# Patient Record
Sex: FEMALE | Race: WHITE | NOT HISPANIC OR LATINO | Employment: FULL TIME | URBAN - METROPOLITAN AREA
[De-identification: names, ages, dates, MRNs, and addresses within clinical notes are randomized per-mention and may not be internally consistent; named-entity substitution may affect disease eponyms.]

---

## 2019-11-05 ENCOUNTER — OFFICE VISIT (OUTPATIENT)
Dept: URGENT CARE | Facility: CLINIC | Age: 34
End: 2019-11-05

## 2019-11-05 VITALS
HEIGHT: 66 IN | WEIGHT: 180 LBS | HEART RATE: 70 BPM | BODY MASS INDEX: 28.93 KG/M2 | SYSTOLIC BLOOD PRESSURE: 114 MMHG | RESPIRATION RATE: 18 BRPM | OXYGEN SATURATION: 98 % | TEMPERATURE: 99 F | DIASTOLIC BLOOD PRESSURE: 72 MMHG

## 2019-11-05 DIAGNOSIS — J06.9 VIRAL URI: Primary | ICD-10-CM

## 2019-11-05 DIAGNOSIS — J02.9 SORE THROAT: ICD-10-CM

## 2019-11-05 LAB
CTP QC/QA: YES
S PYO RRNA THROAT QL PROBE: NEGATIVE

## 2019-11-05 PROCEDURE — 99203 OFFICE O/P NEW LOW 30 MIN: CPT | Mod: S$GLB,,, | Performed by: FAMILY MEDICINE

## 2019-11-05 PROCEDURE — 99203 PR OFFICE/OUTPT VISIT, NEW, LEVL III, 30-44 MIN: ICD-10-PCS | Mod: S$GLB,,, | Performed by: FAMILY MEDICINE

## 2019-11-05 PROCEDURE — 87880 STREP A ASSAY W/OPTIC: CPT | Mod: QW,S$GLB,, | Performed by: FAMILY MEDICINE

## 2019-11-05 PROCEDURE — 87880 POCT RAPID STREP A: ICD-10-PCS | Mod: QW,S$GLB,, | Performed by: FAMILY MEDICINE

## 2019-11-05 NOTE — PROGRESS NOTES
"Subjective:       Patient ID: Terri Newell is a 34 y.o. female.    Vitals:  height is 5' 6" (1.676 m) and weight is 81.6 kg (180 lb). Her tympanic temperature is 99 °F (37.2 °C). Her blood pressure is 114/72 and her pulse is 70. Her respiration is 18 and oxygen saturation is 98%.     Chief Complaint: Oral Pain (thrush)    34-year-old female sore throat as well as sores on the right side of her tongue.  No nausea or vomiting.    Oral Pain    This is a new problem. Episode onset: 3 days. The problem occurs constantly. The problem has been unchanged. The pain is at a severity of 8/10. The pain is moderate. Associated symptoms include difficulty swallowing. Treatments tried: home remedis. The treatment provided no relief.       Constitution: Negative for chills.   HENT: Negative for facial swelling.    Neck: Negative for painful lymph nodes.   Eyes: Negative for eye itching and eyelid swelling.   Musculoskeletal: Negative for joint pain and joint swelling.   Skin: Negative for color change, pale, wound, abrasion, laceration, lesion, skin thickening/induration, puncture wound, erythema, bruising, abscess, avulsion and hives.   Allergic/Immunologic: Negative for environmental allergies, immunocompromised state and hives.   Hematologic/Lymphatic: Negative for swollen lymph nodes.       Objective:      Physical Exam   Constitutional: She is oriented to person, place, and time. She appears well-developed and well-nourished. She is cooperative.  Non-toxic appearance. She does not have a sickly appearance. She does not appear ill. No distress.   HENT:   Head: Normocephalic and atraumatic.   Right Ear: Hearing, tympanic membrane, external ear and ear canal normal.   Left Ear: Hearing, tympanic membrane, external ear and ear canal normal.   Nose: Nose normal. No mucosal edema, rhinorrhea or nasal deformity. No epistaxis. Right sinus exhibits no maxillary sinus tenderness and no frontal sinus tenderness. Left sinus exhibits " no maxillary sinus tenderness and no frontal sinus tenderness.   Mouth/Throat: Uvula is midline and mucous membranes are normal. No trismus in the jaw. Normal dentition. No uvula swelling. No oropharyngeal exudate, posterior oropharyngeal edema or posterior oropharyngeal erythema.   2+ erythema to pharynx.  No exudate.  Sinuses nontender.  Two small ulcerative lesions to the right border of her tongue.   Eyes: Conjunctivae and lids are normal. No scleral icterus.   Neck: Trachea normal, normal range of motion, full passive range of motion without pain and phonation normal. Neck supple. No neck rigidity. No edema and no erythema present.   Cardiovascular: Normal rate, regular rhythm, normal heart sounds, intact distal pulses and normal pulses. Exam reveals no friction rub.   No murmur heard.  Pulmonary/Chest: Effort normal and breath sounds normal. No stridor. No respiratory distress. She has no decreased breath sounds. She has no wheezes. She has no rhonchi. She has no rales.   Abdominal: Normal appearance.   Musculoskeletal: Normal range of motion. She exhibits no edema or deformity.   Lymphadenopathy:     She has no cervical adenopathy.   Neurological: She is alert and oriented to person, place, and time. She exhibits normal muscle tone. Coordination normal.   Skin: Skin is warm, dry, intact, not diaphoretic and not pale. erythema  Psychiatric: She has a normal mood and affect. Her speech is normal and behavior is normal. Judgment and thought content normal. Cognition and memory are normal.   Nursing note and vitals reviewed.        Results for orders placed or performed in visit on 11/05/19   POCT rapid strep A   Result Value Ref Range    Rapid Strep A Screen Negative Negative     Acceptable Yes      Assessment:       1. Viral URI    2. Sore throat        Plan:         Viral URI  -     (Magic mouthwash) 1:1:1 Benadryl 12.5mg/5ml liq, aluminum & magnesium hydroxide-simehticone (Maalox), lidocaine  viscous 2%; Swish and spit 10 mLs every 4 (four) hours as needed.  Dispense: 90 mL; Refill: 0    Sore throat  -     POCT rapid strep A    Make sure that you follow up with your primary care doctor in the next 2-5 days if needed .  Return to the Urgent Care if signs or symptoms change and certainly if you have worsening symptoms go to the nearest emergency department for further evaluation.

## 2019-11-05 NOTE — PATIENT INSTRUCTIONS
Viral Upper Respiratory Illness (Adult)  You have a viral upper respiratory illness (URI), which is another term for the common cold. This illness is contagious during the first few days. It is spread through the air by coughing and sneezing. It may also be spread by direct contact (touching the sick person and then touching your own eyes, nose, or mouth). Frequent handwashing will decrease risk of spread. Most viral illnesses go away within 7 to 10 days with rest and simple home remedies. Sometimes the illness may last for several weeks. Antibiotics will not kill a virus, and they are generally not prescribed for this condition.    Home care  · If symptoms are severe, rest at home for the first 2 to 3 days. When you resume activity, don't let yourself get too tired.  · Avoid being exposed to cigarette smoke (yours or others).  · You may use acetaminophen or ibuprofen to control pain and fever, unless another medicine was prescribed. (Note: If you have chronic liver or kidney disease, have ever had a stomach ulcer or gastrointestinal bleeding, or are taking blood-thinning medicines, talk with your healthcare provider before using these medicines.) Aspirin should never be given to anyone under 18 years of age who is ill with a viral infection or fever. It may cause severe liver or brain damage.  · Your appetite may be poor, so a light diet is fine. Avoid dehydration by drinking 6 to 8 glasses of fluids per day (water, soft drinks, juices, tea, or soup). Extra fluids will help loosen secretions in the nose and lungs.  · Over-the-counter cold medicines will not shorten the length of time youre sick, but they may be helpful for the following symptoms: cough, sore throat, and nasal and sinus congestion. (Note: Do not use decongestants if you have high blood pressure.)  Follow-up care  Follow up with your healthcare provider, or as advised.  When to seek medical advice  Call your healthcare provider right away if any  of these occur:  · Cough with lots of colored sputum (mucus)  · Severe headache; face, neck, or ear pain  · Difficulty swallowing due to throat pain  · Fever of 100.4°F (38°C)  Call 911, or get immediate medical care  Call emergency services right away if any of these occur:  · Chest pain, shortness of breath, wheezing, or difficulty breathing  · Coughing up blood  · Inability to swallow due to throat pain  Date Last Reviewed: 9/13/2015  © 6236-8345 [employer]. [address] [place] [address]. All rights reserved. This information is not intended as a substitute for professional medical care. Always follow your healthcare professional's instructions.        Make sure that you follow up with your primary care doctor in the next 2-5 days if needed .  Return to the Urgent Care if signs or symptoms change and certainly if you have worsening symptoms go to the nearest emergency department for further evaluation.

## 2020-01-14 ENCOUNTER — OFFICE VISIT (OUTPATIENT)
Dept: URGENT CARE | Facility: CLINIC | Age: 35
End: 2020-01-14
Payer: COMMERCIAL

## 2020-01-14 VITALS
HEART RATE: 63 BPM | HEIGHT: 66 IN | WEIGHT: 180 LBS | TEMPERATURE: 99 F | OXYGEN SATURATION: 98 % | BODY MASS INDEX: 28.93 KG/M2 | SYSTOLIC BLOOD PRESSURE: 118 MMHG | RESPIRATION RATE: 18 BRPM | DIASTOLIC BLOOD PRESSURE: 69 MMHG

## 2020-01-14 DIAGNOSIS — R68.89 FLU-LIKE SYMPTOMS: ICD-10-CM

## 2020-01-14 DIAGNOSIS — J06.9 VIRAL URI: ICD-10-CM

## 2020-01-14 DIAGNOSIS — J98.01 BRONCHOSPASM: Primary | ICD-10-CM

## 2020-01-14 LAB
CTP QC/QA: YES
FLUAV AG NPH QL: NEGATIVE
FLUBV AG NPH QL: NEGATIVE

## 2020-01-14 PROCEDURE — 87804 POCT INFLUENZA A/B: ICD-10-PCS | Mod: 59,QW,S$GLB, | Performed by: FAMILY MEDICINE

## 2020-01-14 PROCEDURE — 87804 INFLUENZA ASSAY W/OPTIC: CPT | Mod: QW,S$GLB,, | Performed by: FAMILY MEDICINE

## 2020-01-14 PROCEDURE — 99214 PR OFFICE/OUTPT VISIT, EST, LEVL IV, 30-39 MIN: ICD-10-PCS | Mod: 25,S$GLB,, | Performed by: FAMILY MEDICINE

## 2020-01-14 PROCEDURE — 99214 OFFICE O/P EST MOD 30 MIN: CPT | Mod: 25,S$GLB,, | Performed by: FAMILY MEDICINE

## 2020-01-14 RX ORDER — BENZONATATE 200 MG/1
200 CAPSULE ORAL 3 TIMES DAILY PRN
Qty: 30 CAPSULE | Refills: 0 | Status: SHIPPED | OUTPATIENT
Start: 2020-01-14 | End: 2023-01-20 | Stop reason: ALTCHOICE

## 2020-01-14 RX ORDER — PREDNISONE 20 MG/1
40 TABLET ORAL DAILY
Qty: 8 TABLET | Refills: 0 | Status: SHIPPED | OUTPATIENT
Start: 2020-01-14 | End: 2020-01-18

## 2020-01-14 NOTE — PROGRESS NOTES
"Subjective:       Patient ID: Terri Newell is a 34 y.o. female.    Vitals:  height is 5' 6" (1.676 m) and weight is 81.6 kg (180 lb). Her temperature is 98.8 °F (37.1 °C). Her blood pressure is 118/69 and her pulse is 63. Her respiration is 18 and oxygen saturation is 98%.     Chief Complaint: Sinus Problem    Pt c/o sore throat, chest congestion, sinus pressure, headache. Productive cough that began 1 week ago.  No consistently colored sputum.  Denies history of asthma or reactive airways.  Nonsmoker.  Denies history of allergic rhinitis.    Sinus Problem   This is a new problem. The current episode started in the past 7 days. The problem is unchanged. There has been no fever. She is experiencing no pain. Associated symptoms include congestion, coughing, ear pain, sinus pressure and a sore throat. Pertinent negatives include no chills, diaphoresis or shortness of breath. Treatments tried: mucinex. The treatment provided no relief.       Constitution: Positive for fever. Negative for chills, sweating and fatigue.   HENT: Positive for ear pain, congestion, sinus pressure and sore throat. Negative for sinus pain and voice change.    Neck: Negative for painful lymph nodes.   Eyes: Negative for eye redness.   Respiratory: Positive for cough and sputum production. Negative for chest tightness, bloody sputum, COPD, shortness of breath, stridor, wheezing and asthma.    Gastrointestinal: Negative for nausea and vomiting.   Musculoskeletal: Negative for muscle ache.   Skin: Negative for rash.   Allergic/Immunologic: Negative for seasonal allergies and asthma.   Hematologic/Lymphatic: Negative for swollen lymph nodes.       Objective:      Physical Exam   Constitutional: She is oriented to person, place, and time. She appears well-developed and well-nourished. She is cooperative.  Non-toxic appearance. She does not have a sickly appearance. She does not appear ill. No distress.   HENT:   Head: Normocephalic and " atraumatic.   Right Ear: Hearing, tympanic membrane, external ear and ear canal normal.   Left Ear: Hearing, tympanic membrane, external ear and ear canal normal.   Nose: Nose normal. No mucosal edema, rhinorrhea or nasal deformity. No epistaxis. Right sinus exhibits no maxillary sinus tenderness and no frontal sinus tenderness. Left sinus exhibits no maxillary sinus tenderness and no frontal sinus tenderness.   Mouth/Throat: Uvula is midline, oropharynx is clear and moist and mucous membranes are normal. No trismus in the jaw. Normal dentition. No uvula swelling. No oropharyngeal exudate, posterior oropharyngeal edema or posterior oropharyngeal erythema.   Head congestion noted.   Eyes: Conjunctivae and lids are normal. No scleral icterus.   Neck: Trachea normal, normal range of motion, full passive range of motion without pain and phonation normal. Neck supple. No neck rigidity. No edema and no erythema present.   Cardiovascular: Normal rate, regular rhythm, normal heart sounds, intact distal pulses and normal pulses.   Pulmonary/Chest: Effort normal. No stridor. No respiratory distress. She has no decreased breath sounds. She has no rhonchi. She has no rales.   Forced exhalation triggers faint end expiratory wheeze and cough.  Otherwise clear to auscultation bilaterally. No rales.   Abdominal: Normal appearance.   Musculoskeletal: Normal range of motion. She exhibits no edema or deformity.   Lymphadenopathy:     She has no cervical adenopathy.   Neurological: She is alert and oriented to person, place, and time. She exhibits normal muscle tone. Coordination normal.   Skin: Skin is warm, dry, intact, not diaphoretic and not pale.   Psychiatric: She has a normal mood and affect. Her speech is normal and behavior is normal. Judgment and thought content normal. Cognition and memory are normal.   Nursing note and vitals reviewed.        Assessment:       1. Bronchospasm    2. Flu-like symptoms    3. Viral URI         Plan:         Bronchospasm  -     predniSONE (DELTASONE) 20 MG tablet; Take 2 tablets (40 mg total) by mouth once daily. for 4 days  Dispense: 8 tablet; Refill: 0    Flu-like symptoms  -     POCT Influenza A/B    Viral URI  -     benzonatate (TESSALON) 200 MG capsule; Take 1 capsule (200 mg total) by mouth 3 (three) times daily as needed for Cough.  Dispense: 30 capsule; Refill: 0    Make sure that you follow up with your primary care doctor in the next 2-5 days if needed .  Return to the Urgent Care if signs or symptoms change and certainly if you have worsening symptoms go to the nearest emergency department for further evaluation.

## 2020-01-14 NOTE — PATIENT INSTRUCTIONS
Bronchospasm (Adult)    Bronchospasm occurs when the airways (bronchial tubes) go into spasm and contract. This makes it hard to breathe and causes wheezing (a high-pitched whistling sound). Bronchospasm can also cause frequent coughing without wheezing.  Bronchospasm is due to irritation, inflammation, or allergic reaction of the airways. People with asthma get bronchospasm. However, not everyone with bronchospasm has asthma.  Being exposed to harmful fumes, a recent case of bronchitis, exercise, or a flare-up of chronic obstructive pulmonary disease (COPD) may cause the airways to spasm. An episode of bronchospasm may last 7 to 14 days. Medicine may be prescribed to relax the airways and prevent wheezing. Antibiotics will be prescribed only if your healthcare provider thinks there is a bacterial infection. Antibiotics do not help a viral infection.  Home care  · Drink lots of water or other fluids (at least 10 glasses a day) during an attack. This will loosen lung secretions and make it easier to breathe. If you have heart or kidney disease, check with your doctor before you drink extra fluids.  · Take prescribed medicine exactly at the times advised. If you take an inhaled medicine to help with breathing, do not use it more than once every 4 hours, unless told to do so. If prescribed an antibiotic or prednisone, take all of the medicine, even if you are feeling better after a few days.  · Do not smoke. Also avoid being exposed to secondhand smoke.  · If you were given an inhaler, use it exactly as directed. If you need to use it more often than prescribed, your condition may be getting worse. Contact your healthcare provider.  Follow-up care  Follow up with your healthcare provider, or as advised.  Note: If you are age 65 or older, have a chronic lung disease or condition that affects your immune system, or you smoke, we recommend getting pneumococcal vaccinations, as well as an influenza vaccination (flu shot)  every autumn. Ask your healthcare provider about this.  When to seek medical advice  Call your healthcare provider right away if any of these occur:  · You need to use your inhalers more often than usual.  · You develop a fever of 100.4°F (38°C) or higher.  · You are coughing up lots of dark-colored sputum (mucus).  · You do not start to improve within 24 hours.  Call 911, or get immediate medical care  Contact emergency services if any of these occur:  · Coughing up bloody sputum (mucus)  · Chest pain with each breath  · Increased wheezing or shortness of breath  Date Last Reviewed: 9/13/2015  © 9062-4861 [employer]. [address] [place] [address], [place] [address]. All rights reserved. This information is not intended as a substitute for professional medical care. Always follow your healthcare professional's instructions.        Make sure that you follow up with your primary care doctor in the next 2-5 days if needed .  Return to the Urgent Care if signs or symptoms change and certainly if you have worsening symptoms go to the nearest emergency department for further evaluation.

## 2020-11-17 LAB — THIN PREP: NEGATIVE

## 2021-04-28 ENCOUNTER — PATIENT MESSAGE (OUTPATIENT)
Dept: RESEARCH | Facility: HOSPITAL | Age: 36
End: 2021-04-28

## 2023-01-20 ENCOUNTER — OFFICE VISIT (OUTPATIENT)
Dept: URGENT CARE | Facility: CLINIC | Age: 38
End: 2023-01-20
Payer: COMMERCIAL

## 2023-01-20 VITALS
DIASTOLIC BLOOD PRESSURE: 74 MMHG | HEIGHT: 66 IN | BODY MASS INDEX: 28.93 KG/M2 | TEMPERATURE: 98 F | OXYGEN SATURATION: 99 % | RESPIRATION RATE: 20 BRPM | HEART RATE: 94 BPM | WEIGHT: 180 LBS | SYSTOLIC BLOOD PRESSURE: 117 MMHG

## 2023-01-20 DIAGNOSIS — R09.89 CHEST CONGESTION: ICD-10-CM

## 2023-01-20 DIAGNOSIS — R05.1 ACUTE COUGH: ICD-10-CM

## 2023-01-20 DIAGNOSIS — J31.0 RHINITIS, UNSPECIFIED TYPE: ICD-10-CM

## 2023-01-20 DIAGNOSIS — J40 BRONCHITIS: Primary | ICD-10-CM

## 2023-01-20 DIAGNOSIS — R09.82 POST-NASAL DRIP: ICD-10-CM

## 2023-01-20 PROCEDURE — 94640 AIRWAY INHALATION TREATMENT: CPT | Mod: S$GLB,,, | Performed by: FAMILY MEDICINE

## 2023-01-20 PROCEDURE — 99213 OFFICE O/P EST LOW 20 MIN: CPT | Mod: 25,S$GLB,, | Performed by: NURSE PRACTITIONER

## 2023-01-20 PROCEDURE — 3074F SYST BP LT 130 MM HG: CPT | Mod: CPTII,S$GLB,, | Performed by: NURSE PRACTITIONER

## 2023-01-20 PROCEDURE — 94640 PR INHAL RX, AIRWAY OBST/DX SPUTUM INDUCT: ICD-10-PCS | Mod: S$GLB,,, | Performed by: FAMILY MEDICINE

## 2023-01-20 PROCEDURE — 1160F RVW MEDS BY RX/DR IN RCRD: CPT | Mod: CPTII,S$GLB,, | Performed by: NURSE PRACTITIONER

## 2023-01-20 PROCEDURE — 3078F PR MOST RECENT DIASTOLIC BLOOD PRESSURE < 80 MM HG: ICD-10-PCS | Mod: CPTII,S$GLB,, | Performed by: NURSE PRACTITIONER

## 2023-01-20 PROCEDURE — 3078F DIAST BP <80 MM HG: CPT | Mod: CPTII,S$GLB,, | Performed by: NURSE PRACTITIONER

## 2023-01-20 PROCEDURE — 3008F BODY MASS INDEX DOCD: CPT | Mod: CPTII,S$GLB,, | Performed by: NURSE PRACTITIONER

## 2023-01-20 PROCEDURE — 99213 PR OFFICE/OUTPT VISIT, EST, LEVL III, 20-29 MIN: ICD-10-PCS | Mod: 25,S$GLB,, | Performed by: NURSE PRACTITIONER

## 2023-01-20 PROCEDURE — 1159F PR MEDICATION LIST DOCUMENTED IN MEDICAL RECORD: ICD-10-PCS | Mod: CPTII,S$GLB,, | Performed by: NURSE PRACTITIONER

## 2023-01-20 PROCEDURE — 3074F PR MOST RECENT SYSTOLIC BLOOD PRESSURE < 130 MM HG: ICD-10-PCS | Mod: CPTII,S$GLB,, | Performed by: NURSE PRACTITIONER

## 2023-01-20 PROCEDURE — 3008F PR BODY MASS INDEX (BMI) DOCUMENTED: ICD-10-PCS | Mod: CPTII,S$GLB,, | Performed by: NURSE PRACTITIONER

## 2023-01-20 PROCEDURE — 1160F PR REVIEW ALL MEDS BY PRESCRIBER/CLIN PHARMACIST DOCUMENTED: ICD-10-PCS | Mod: CPTII,S$GLB,, | Performed by: NURSE PRACTITIONER

## 2023-01-20 PROCEDURE — 1159F MED LIST DOCD IN RCRD: CPT | Mod: CPTII,S$GLB,, | Performed by: NURSE PRACTITIONER

## 2023-01-20 RX ORDER — ALBUTEROL SULFATE 0.83 MG/ML
2.5 SOLUTION RESPIRATORY (INHALATION)
Status: COMPLETED | OUTPATIENT
Start: 2023-01-20 | End: 2023-01-20

## 2023-01-20 RX ORDER — PREDNISONE 10 MG/1
10 TABLET ORAL DAILY
Qty: 3 TABLET | Refills: 0 | Status: SHIPPED | OUTPATIENT
Start: 2023-01-20 | End: 2023-01-23

## 2023-01-20 RX ORDER — FLUTICASONE PROPIONATE 50 MCG
2 SPRAY, SUSPENSION (ML) NASAL DAILY PRN
Qty: 15.8 ML | Refills: 0 | Status: SHIPPED | OUTPATIENT
Start: 2023-01-20 | End: 2023-01-31

## 2023-01-20 RX ORDER — BENZONATATE 100 MG/1
100 CAPSULE ORAL 3 TIMES DAILY PRN
Qty: 30 CAPSULE | Refills: 0 | Status: SHIPPED | OUTPATIENT
Start: 2023-01-20 | End: 2023-01-30

## 2023-01-20 RX ADMIN — ALBUTEROL SULFATE 2.5 MG: 0.83 SOLUTION RESPIRATORY (INHALATION) at 08:01

## 2023-01-20 NOTE — PROGRESS NOTES
"Subjective:       Patient ID: Terri Newell is a 37 y.o. female.    Vitals:  height is 5' 6" (1.676 m) and weight is 81.6 kg (180 lb). Her temperature is 98.4 °F (36.9 °C). Her blood pressure is 117/74 and her pulse is 94. Her respiration is 20 and oxygen saturation is 99%.     Chief Complaint: Cough    Pt presents with complaint of productive cough, headache x3 days.  Pt states she was experiencing cold symptoms last week and states not they have all subsided except for her cough.  Pt states she has taken ibuprofen and mucinex for her symptoms.  Pt states she took a covid test last night and states it was negative      37-year-old female presents to clinic with complaints of headache, postnasal drip, cough, chest congestion x 3 days. Treating with Mucinex that is not helping and ibuprofen. Home covid test with negative results. History positive for Moderna covid vaccine x 3 doses 4/55/21, 5/3/21 and 12/26/21, bronchospasm     Cough  This is a new problem. The current episode started 1 to 4 weeks ago. The problem has been unchanged. The problem occurs constantly. The cough is Productive of sputum. Associated symptoms include postnasal drip. Pertinent negatives include no chills, fever, myalgias or sore throat. Nothing aggravates the symptoms. Treatments tried: mucinex. The treatment provided no relief.     Constitution: Negative for chills, sweating, fatigue and fever.   HENT:  Positive for congestion and postnasal drip. Negative for sore throat and trouble swallowing.    Respiratory:  Positive for cough and sputum production.    Gastrointestinal:  Negative for abdominal pain, nausea and vomiting.   Musculoskeletal:  Negative for muscle ache.   Allergic/Immunologic: Negative for seasonal allergies.     Objective:      Physical Exam   Constitutional: She is oriented to person, place, and time. She appears well-developed. She is cooperative.  Non-toxic appearance. She does not appear ill. No distress.   HENT: "   Head: Normocephalic and atraumatic.   Ears:   Right Ear: Hearing, external ear and ear canal normal. Tympanic membrane is bulging.   Left Ear: Hearing, external ear and ear canal normal. Tympanic membrane is bulging.   Nose: Mucosal edema and rhinorrhea present. No nasal deformity. No epistaxis. Right sinus exhibits no maxillary sinus tenderness and no frontal sinus tenderness. Left sinus exhibits no maxillary sinus tenderness and no frontal sinus tenderness.   Mouth/Throat: Uvula is midline, oropharynx is clear and moist and mucous membranes are normal. No trismus in the jaw. Normal dentition. No uvula swelling. No oropharyngeal exudate, posterior oropharyngeal edema or posterior oropharyngeal erythema.   Eyes: Conjunctivae and lids are normal. No scleral icterus.   Neck: Trachea normal and phonation normal. Neck supple. No edema present. No erythema present. No neck rigidity present.   Cardiovascular: Normal rate, regular rhythm, normal heart sounds and normal pulses.   Pulmonary/Chest: Effort normal and breath sounds normal. No respiratory distress. She has no decreased breath sounds. She has no rhonchi.   Scattered crackles via bronchial region with cough         Comments: Scattered crackles via bronchial region with cough    Abdominal: Normal appearance.   Musculoskeletal: Normal range of motion.         General: No deformity. Normal range of motion.   Neurological: She is alert and oriented to person, place, and time. She exhibits normal muscle tone. Coordination normal.   Skin: Skin is warm, dry, intact, not diaphoretic and not pale.   Psychiatric: Her speech is normal and behavior is normal. Judgment and thought content normal.   Nursing note and vitals reviewed.    Reports less chest congestion, post aerosol treatment      Assessment:       1. Bronchitis    2. Acute cough    3. Chest congestion    4. Post-nasal drip    5. Rhinitis, unspecified type          Plan:       I have reviewed the patients'  previous visits and labs to look for any trends or previous treatments.    Bronchitis  -     predniSONE (DELTASONE) 10 MG tablet; Take 1 tablet (10 mg total) by mouth once daily. for 3 days  Dispense: 3 tablet; Refill: 0    Acute cough  -     albuterol nebulizer solution 2.5 mg  -     benzonatate (TESSALON) 100 MG capsule; Take 1 capsule (100 mg total) by mouth 3 (three) times daily as needed for Cough.  Dispense: 30 capsule; Refill: 0    Chest congestion  -     albuterol nebulizer solution 2.5 mg  -     benzonatate (TESSALON) 100 MG capsule; Take 1 capsule (100 mg total) by mouth 3 (three) times daily as needed for Cough.  Dispense: 30 capsule; Refill: 0  -     predniSONE (DELTASONE) 10 MG tablet; Take 1 tablet (10 mg total) by mouth once daily. for 3 days  Dispense: 3 tablet; Refill: 0    Post-nasal drip    Rhinitis, unspecified type  -     fluticasone propionate (FLONASE) 50 mcg/actuation nasal spray; 2 sprays (100 mcg total) by Each Nostril route daily as needed for Rhinitis.  Dispense: 15.8 mL; Refill: 0         Patient Instructions   Please drink plenty of fluids.  Please get plenty of rest.  Please return here or go to the Emergency Department for any concerns or worsening of condition.  You were given a prescription for a steroid Prednisone  If not allergic, please take over the counter Tylenol (Acetaminophen) and/or Motrin (Ibuprofen) as directed for control of pain and/or fever.  Please follow up with your primary care doctor or specialist as needed.  Please return here or go to the Emergency Department for any concerns or worsening of condition.  If you  smoke, please stop smoking.

## 2023-01-20 NOTE — PATIENT INSTRUCTIONS
Please drink plenty of fluids.  Please get plenty of rest.  Please return here or go to the Emergency Department for any concerns or worsening of condition.  You were given a prescription for a steroid Prednisone  If not allergic, please take over the counter Tylenol (Acetaminophen) and/or Motrin (Ibuprofen) as directed for control of pain and/or fever.  Please follow up with your primary care doctor or specialist as needed.  Please return here or go to the Emergency Department for any concerns or worsening of condition.  If you  smoke, please stop smoking.

## 2023-01-27 NOTE — PROGRESS NOTES
FAMILY MEDICINE  [facility]    Reason for visit:   Chief Complaint   Patient presents with    Establish Care         SUBJECTIVE: Terri Newell is a 37 y.o. female  - with history of right oophorectomy presents as a new patient to establish care and discuss concerns with thyroid. Last PCP none    [facility]  Last PAP: 2020 release of information signed for a copy    Terri Newell reports over the last year she is been having symptoms concerning for thyroid issues.  She reports that her mother has a history of thyroid nodules and had to have thyroidectomy.  Her mother's currently on thyroid supplementation.  She reports the nodules were benign and noncancerous.    She reports over the last year she is been having increased fatigue.  She also feels very stressed.  She finds that she has had difficulty sleeping.  She is also felt that her menses has changed.  She feels that her menstrual cycle is heavier and seems to last more days.  Remains regular.  She currently does not have a gynecologist but is up-to-date the Pap [facility] which [facility]  She also reports that it is difficult for her to eat.  She does not have an appetite and reports that she has to remind herself to eat. She does have increased stressors with work.   She also reports recurrent cervical lymphadenopathy bilaterally.  She reports that it is most prominent prior to her menses.  She reports after her menses they seem to reduce in size.  They can be sore at times.  She denies any difficulty swallowing eating or changes in speech.      Review of Systems   Constitutional:  Positive for malaise/fatigue. Negative for fever and weight loss.   All other systems reviewed and are negative.    HEALTH MAINTENANCE:   Health Maintenance   Topic Date Due    Hepatitis C Screening  Never done    Lipid Panel  Never done    TETANUS VACCINE  Never done     The patient has no  Health Maintenance topics of status Not Due  Health Maintenance Due   Topic Date Due    Hepatitis C Screening  Never done    Cervical Cancer Screening  Never done    Lipid Panel  Never done    Pneumococcal Vaccines (Age 0-64) (1 - PCV) Never done    HIV Screening  Never done    TETANUS VACCINE  Never done    COVID-19 Vaccine (4 - Booster for Moderna series) 02/20/2022    Influenza Vaccine (1) Never done       HISTORY:   History reviewed. No pertinent past medical history.    Past Surgical History:   Procedure Laterality Date    OVARIAN CYST REMOVAL Right 2007    21 cm ovarian cyst       Family History   Problem Relation Age of Onset    Thyroid disease Mother     Thyroid nodules Mother     Diabetes Father     Hearing loss Father     Hypertension Father     Kidney disease Father     Arthritis Maternal Grandmother     Heart disease Maternal Grandfather     Arthritis Paternal Grandmother     Cancer Paternal Grandmother         Glioblastoma    Cancer Paternal Grandfather         Melanoma    Diabetes Paternal Grandfather        Social History     Tobacco Use    Smoking status: Never     Passive exposure: Never   Substance Use Topics    Alcohol use: Never    Drug use: Never       Social History     Social History Narrative    Single. WSM with monogamous partner since 2019. 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 Previously lived in [place] but has been in [facility] since 2007       ALLERGIES:   Review of patient's allergies indicates:  No Known Allergies    MEDS:     Current Outpatient Medications:     benzonatate (TESSALON) 100 MG capsule, Take 1 capsule (100 mg total) by mouth 3 (three) times daily as needed for Cough., Disp: 30 capsule, Rfl: 0    fluticasone propionate (FLONASE) 50 mcg/actuation nasal spray, 2 sprays (100 mcg total) by Each Nostril route daily as needed for Rhinitis. (Patient not taking: Reported on 1/30/2023), Disp: 15.8 mL, Rfl: 0      Vital signs:   Vitals:    01/30/23 0955   BP: 120/82   Pulse:  "76   SpO2: 98%   Weight: 88.5 kg (195 lb 1.7 oz)   Height: 5' 9" (1.753 m)     Body mass index is 28.81 kg/m².    PHYSICAL EXAM:     Physical Exam  Vitals reviewed.   Constitutional:       General: She is not in acute distress.  HENT:      Head: Normocephalic and atraumatic.   Eyes:      Pupils: Pupils are equal, round, and reactive to light.   Neck:      Thyroid: Thyromegaly present. No thyroid mass or thyroid tenderness.   Cardiovascular:      Rate and Rhythm: Normal rate and regular rhythm.      Heart sounds: Normal heart sounds. No murmur heard.  Pulmonary:      Effort: Pulmonary effort is normal.      Breath sounds: Normal breath sounds. No wheezing or rales.   Abdominal:      General: Bowel sounds are normal. There is no distension.      Palpations: Abdomen is soft.      Tenderness: There is no abdominal tenderness.   Musculoskeletal:      Cervical back: Normal range of motion and neck supple.   Lymphadenopathy:      Cervical: Cervical adenopathy present.      Right cervical: Posterior cervical adenopathy present.      Upper Body:      Right upper body: No supraclavicular adenopathy.      Left upper body: No supraclavicular adenopathy.   Skin:     General: Skin is warm.      Capillary Refill: Capillary refill takes less than 2 seconds.   Neurological:      Mental Status: She is alert.         PERTINENT RESULTS:   Lab Results   Component Value Date    WBC 4.15 (L) 07/30/2011    HGB 13.3 07/30/2011    HCT 37.9 07/30/2011    MCV 88.8 07/30/2011     07/30/2011       BMP  Lab Results   Component Value Date     07/30/2011    K 3.2 (L) 07/30/2011     (H) 07/30/2011    CO2 25 07/30/2011    BUN 4 (L) 07/30/2011    CREATININE 0.7 07/30/2011    CALCIUM 9.2 07/30/2011    ANIONGAP 11 07/30/2011     Lab Results   Component Value Date    ALT 37 07/30/2011    AST 29 07/30/2011    ALKPHOS 69 07/30/2011    BILITOT 0.2 07/30/2011     No results found for: CHOL  No results found for: HDL  No results found for: " LDLCALC  No results found for: TRIG  No results found for: CHOLHDL  No results found for: LABA1C, HGBA1C  No results found for: TSH, I3BNYFB, D8TKFTT, THYROIDAB, FREET4    ASSESSMENT/PLAN:    1. Fatigue, unspecified type  Overview:  - recommend lab evaluation      2. Lymphadenopathy  Overview:  - cervical that appears near her menses  - family history of large thyroid nodule and thyroid disease  - recommend US    Orders:  -     Thyroid Peroxidase Antibody; Future; Expected date: 01/30/2023  -     T4, Free; Future; Expected date: 01/30/2023  -     US Soft Tissue Head Neck Thyroid; Future; Expected date: 01/30/2023    3. Menorrhagia with regular cycle  Overview:  - check labs  - recommend Gynecology evaluation     Orders:  -     Ambulatory referral/consult to Obstetrics / Gynecology; Future; Expected date: 02/06/2023    4. Dysmenorrhea  Overview:  - check labs  - recommend Gynecology evaluation     Orders:  -     Ambulatory referral/consult to Obstetrics / Gynecology; Future; Expected date: 02/06/2023    5. History of oophorectomy, unilateral  Overview:  - 2007 right oophorectomy due to benign large ovarian cyst  - reports that her gynecologist recommended yearly US    Orders:  -     Ambulatory referral/consult to Obstetrics / Gynecology; Future; Expected date: 02/06/2023    6. Screening for HIV (human immunodeficiency virus)  -     HIV 1/2 Ag/Ab (4th Gen); Future; Expected date: 01/30/2023    7. Need for hepatitis C screening test  -     Hepatitis C Antibody; Future; Expected date: 01/30/2023    8. Screening for metabolic disorder  -     Comprehensive Metabolic Panel; Future; Expected date: 01/30/2023    9. Encounter for lipid screening for cardiovascular disease  -     Lipid Panel; Future; Expected date: 01/30/2023    10. Screening, iron deficiency anemia  -     CBC Without Differential; Future; Expected date: 01/30/2023    11. Screening for diabetes mellitus (DM)  -     Hemoglobin A1C; Future; Expected date:  01/30/2023    12. Screening for thyroid disorder  -     TSH; Future; Expected date: 01/30/2023  -     Thyroid Peroxidase Antibody; Future; Expected date: 01/30/2023  -     T4, Free; Future; Expected date: 01/30/2023          ORDERS:   Orders Placed This Encounter    US Soft Tissue Head Neck Thyroid    CBC Without Differential    Comprehensive Metabolic Panel    Lipid Panel    HIV 1/2 Ag/Ab (4th Gen)    Hepatitis C Antibody    Hemoglobin A1C    TSH    Thyroid Peroxidase Antibody    T4, Free    Ambulatory referral/consult to Obstetrics / Gynecology       Vaccines recommended: PCV 20, Tdap, flu and covid-19 booster. She declined today    Follow-up pending results or sooner if any concerns.      This note is dictated using the M*Modal Fluency Direct word recognition program. There are word recognition mistakes that are occasionally missed on review.    Dr. Carissa Calabrese D.O.   Family Medicine

## 2023-01-30 ENCOUNTER — OFFICE VISIT (OUTPATIENT)
Dept: PRIMARY CARE CLINIC | Facility: CLINIC | Age: 38
End: 2023-01-30
Attending: FAMILY MEDICINE
Payer: COMMERCIAL

## 2023-01-30 VITALS
BODY MASS INDEX: 28.9 KG/M2 | HEART RATE: 76 BPM | SYSTOLIC BLOOD PRESSURE: 120 MMHG | DIASTOLIC BLOOD PRESSURE: 82 MMHG | WEIGHT: 195.13 LBS | HEIGHT: 69 IN | OXYGEN SATURATION: 98 %

## 2023-01-30 DIAGNOSIS — Z13.228 SCREENING FOR METABOLIC DISORDER: ICD-10-CM

## 2023-01-30 DIAGNOSIS — R59.1 LYMPHADENOPATHY: ICD-10-CM

## 2023-01-30 DIAGNOSIS — Z11.59 NEED FOR HEPATITIS C SCREENING TEST: ICD-10-CM

## 2023-01-30 DIAGNOSIS — Z13.6 ENCOUNTER FOR LIPID SCREENING FOR CARDIOVASCULAR DISEASE: ICD-10-CM

## 2023-01-30 DIAGNOSIS — Z11.4 SCREENING FOR HIV (HUMAN IMMUNODEFICIENCY VIRUS): ICD-10-CM

## 2023-01-30 DIAGNOSIS — Z13.1 SCREENING FOR DIABETES MELLITUS (DM): ICD-10-CM

## 2023-01-30 DIAGNOSIS — Z13.0 SCREENING, IRON DEFICIENCY ANEMIA: ICD-10-CM

## 2023-01-30 DIAGNOSIS — Z90.721 HISTORY OF OOPHORECTOMY, UNILATERAL: ICD-10-CM

## 2023-01-30 DIAGNOSIS — Z13.220 ENCOUNTER FOR LIPID SCREENING FOR CARDIOVASCULAR DISEASE: ICD-10-CM

## 2023-01-30 DIAGNOSIS — R53.83 FATIGUE, UNSPECIFIED TYPE: Primary | ICD-10-CM

## 2023-01-30 DIAGNOSIS — Z13.29 SCREENING FOR THYROID DISORDER: ICD-10-CM

## 2023-01-30 DIAGNOSIS — N94.6 DYSMENORRHEA: ICD-10-CM

## 2023-01-30 DIAGNOSIS — N92.0 MENORRHAGIA WITH REGULAR CYCLE: ICD-10-CM

## 2023-01-30 PROBLEM — Z00.01 ENCOUNTER FOR GENERAL ADULT MEDICAL EXAMINATION WITH ABNORMAL FINDINGS: Status: RESOLVED | Noted: 2023-01-30 | Resolved: 2023-01-30

## 2023-01-30 PROBLEM — Z00.00 ROUTINE MEDICAL EXAM: Status: ACTIVE | Noted: 2023-01-30

## 2023-01-30 PROBLEM — Z00.01 ENCOUNTER FOR GENERAL ADULT MEDICAL EXAMINATION WITH ABNORMAL FINDINGS: Status: ACTIVE | Noted: 2023-01-30

## 2023-01-30 PROCEDURE — 99214 OFFICE O/P EST MOD 30 MIN: CPT | Mod: S$GLB,,, | Performed by: FAMILY MEDICINE

## 2023-01-30 PROCEDURE — 1159F MED LIST DOCD IN RCRD: CPT | Mod: CPTII,S$GLB,, | Performed by: FAMILY MEDICINE

## 2023-01-30 PROCEDURE — 3079F PR MOST RECENT DIASTOLIC BLOOD PRESSURE 80-89 MM HG: ICD-10-PCS | Mod: CPTII,S$GLB,, | Performed by: FAMILY MEDICINE

## 2023-01-30 PROCEDURE — 1160F RVW MEDS BY RX/DR IN RCRD: CPT | Mod: CPTII,S$GLB,, | Performed by: FAMILY MEDICINE

## 2023-01-30 PROCEDURE — 3074F SYST BP LT 130 MM HG: CPT | Mod: CPTII,S$GLB,, | Performed by: FAMILY MEDICINE

## 2023-01-30 PROCEDURE — 99214 PR OFFICE/OUTPT VISIT, EST, LEVL IV, 30-39 MIN: ICD-10-PCS | Mod: S$GLB,,, | Performed by: FAMILY MEDICINE

## 2023-01-30 PROCEDURE — 3008F BODY MASS INDEX DOCD: CPT | Mod: CPTII,S$GLB,, | Performed by: FAMILY MEDICINE

## 2023-01-30 PROCEDURE — 99999 PR PBB SHADOW E&M-EST. PATIENT-LVL IV: CPT | Mod: PBBFAC,,, | Performed by: FAMILY MEDICINE

## 2023-01-30 PROCEDURE — 3079F DIAST BP 80-89 MM HG: CPT | Mod: CPTII,S$GLB,, | Performed by: FAMILY MEDICINE

## 2023-01-30 PROCEDURE — 3074F PR MOST RECENT SYSTOLIC BLOOD PRESSURE < 130 MM HG: ICD-10-PCS | Mod: CPTII,S$GLB,, | Performed by: FAMILY MEDICINE

## 2023-01-30 PROCEDURE — 3008F PR BODY MASS INDEX (BMI) DOCUMENTED: ICD-10-PCS | Mod: CPTII,S$GLB,, | Performed by: FAMILY MEDICINE

## 2023-01-30 PROCEDURE — 1160F PR REVIEW ALL MEDS BY PRESCRIBER/CLIN PHARMACIST DOCUMENTED: ICD-10-PCS | Mod: CPTII,S$GLB,, | Performed by: FAMILY MEDICINE

## 2023-01-30 PROCEDURE — 99999 PR PBB SHADOW E&M-EST. PATIENT-LVL IV: ICD-10-PCS | Mod: PBBFAC,,, | Performed by: FAMILY MEDICINE

## 2023-01-30 PROCEDURE — 1159F PR MEDICATION LIST DOCUMENTED IN MEDICAL RECORD: ICD-10-PCS | Mod: CPTII,S$GLB,, | Performed by: FAMILY MEDICINE

## 2023-01-31 ENCOUNTER — OFFICE VISIT (OUTPATIENT)
Dept: OBSTETRICS AND GYNECOLOGY | Facility: CLINIC | Age: 38
End: 2023-01-31
Attending: FAMILY MEDICINE
Payer: COMMERCIAL

## 2023-01-31 ENCOUNTER — LAB VISIT (OUTPATIENT)
Dept: LAB | Facility: HOSPITAL | Age: 38
End: 2023-01-31
Attending: FAMILY MEDICINE
Payer: COMMERCIAL

## 2023-01-31 VITALS
SYSTOLIC BLOOD PRESSURE: 105 MMHG | WEIGHT: 193.56 LBS | BODY MASS INDEX: 28.58 KG/M2 | DIASTOLIC BLOOD PRESSURE: 67 MMHG

## 2023-01-31 DIAGNOSIS — Z13.6 ENCOUNTER FOR LIPID SCREENING FOR CARDIOVASCULAR DISEASE: ICD-10-CM

## 2023-01-31 DIAGNOSIS — Z13.29 SCREENING FOR THYROID DISORDER: ICD-10-CM

## 2023-01-31 DIAGNOSIS — R59.1 LYMPHADENOPATHY: ICD-10-CM

## 2023-01-31 DIAGNOSIS — Z13.220 ENCOUNTER FOR LIPID SCREENING FOR CARDIOVASCULAR DISEASE: ICD-10-CM

## 2023-01-31 DIAGNOSIS — Z11.59 NEED FOR HEPATITIS C SCREENING TEST: ICD-10-CM

## 2023-01-31 DIAGNOSIS — Z13.0 SCREENING, IRON DEFICIENCY ANEMIA: ICD-10-CM

## 2023-01-31 DIAGNOSIS — Z11.4 SCREENING FOR HIV (HUMAN IMMUNODEFICIENCY VIRUS): ICD-10-CM

## 2023-01-31 DIAGNOSIS — Z13.228 SCREENING FOR METABOLIC DISORDER: ICD-10-CM

## 2023-01-31 DIAGNOSIS — N92.0 MENORRHAGIA WITH REGULAR CYCLE: ICD-10-CM

## 2023-01-31 DIAGNOSIS — Z12.4 PAP SMEAR FOR CERVICAL CANCER SCREENING: ICD-10-CM

## 2023-01-31 DIAGNOSIS — Z13.1 SCREENING FOR DIABETES MELLITUS (DM): ICD-10-CM

## 2023-01-31 DIAGNOSIS — Z01.419 WELL WOMAN EXAM WITH ROUTINE GYNECOLOGICAL EXAM: Primary | ICD-10-CM

## 2023-01-31 DIAGNOSIS — N94.6 DYSMENORRHEA: ICD-10-CM

## 2023-01-31 LAB
ALBUMIN SERPL BCP-MCNC: 4 G/DL (ref 3.5–5.2)
ALP SERPL-CCNC: 88 U/L (ref 55–135)
ALT SERPL W/O P-5'-P-CCNC: 36 U/L (ref 10–44)
ANION GAP SERPL CALC-SCNC: 11 MMOL/L (ref 8–16)
AST SERPL-CCNC: 17 U/L (ref 10–40)
BILIRUB SERPL-MCNC: 0.4 MG/DL (ref 0.1–1)
BUN SERPL-MCNC: 9 MG/DL (ref 6–20)
CALCIUM SERPL-MCNC: 9.3 MG/DL (ref 8.7–10.5)
CHLORIDE SERPL-SCNC: 106 MMOL/L (ref 95–110)
CHOLEST SERPL-MCNC: 174 MG/DL (ref 120–199)
CHOLEST/HDLC SERPL: 3.8 {RATIO} (ref 2–5)
CO2 SERPL-SCNC: 19 MMOL/L (ref 23–29)
CREAT SERPL-MCNC: 0.7 MG/DL (ref 0.5–1.4)
ERYTHROCYTE [DISTWIDTH] IN BLOOD BY AUTOMATED COUNT: 13.1 % (ref 11.5–14.5)
EST. GFR  (NO RACE VARIABLE): >60 ML/MIN/1.73 M^2
ESTIMATED AVG GLUCOSE: 114 MG/DL (ref 68–131)
GLUCOSE SERPL-MCNC: 90 MG/DL (ref 70–110)
HBA1C MFR BLD: 5.6 % (ref 4–5.6)
HCT VFR BLD AUTO: 41.9 % (ref 37–48.5)
HCV AB SERPL QL IA: NORMAL
HDLC SERPL-MCNC: 46 MG/DL (ref 40–75)
HDLC SERPL: 26.4 % (ref 20–50)
HGB BLD-MCNC: 13.7 G/DL (ref 12–16)
HIV 1+2 AB+HIV1 P24 AG SERPL QL IA: NORMAL
LDLC SERPL CALC-MCNC: 109.6 MG/DL (ref 63–159)
MCH RBC QN AUTO: 29.2 PG (ref 27–31)
MCHC RBC AUTO-ENTMCNC: 32.7 G/DL (ref 32–36)
MCV RBC AUTO: 89 FL (ref 82–98)
NONHDLC SERPL-MCNC: 128 MG/DL
PLATELET # BLD AUTO: 285 K/UL (ref 150–450)
PMV BLD AUTO: 10.1 FL (ref 9.2–12.9)
POTASSIUM SERPL-SCNC: 4 MMOL/L (ref 3.5–5.1)
PROT SERPL-MCNC: 7.1 G/DL (ref 6–8.4)
RBC # BLD AUTO: 4.69 M/UL (ref 4–5.4)
SODIUM SERPL-SCNC: 136 MMOL/L (ref 136–145)
T4 FREE SERPL-MCNC: 0.94 NG/DL (ref 0.71–1.51)
THYROPEROXIDASE IGG SERPL-ACNC: 88.3 IU/ML
TRIGL SERPL-MCNC: 92 MG/DL (ref 30–150)
TSH SERPL DL<=0.005 MIU/L-ACNC: 0.92 UIU/ML (ref 0.4–4)
WBC # BLD AUTO: 10.55 K/UL (ref 3.9–12.7)

## 2023-01-31 PROCEDURE — 99395 PREV VISIT EST AGE 18-39: CPT | Mod: S$GLB,,, | Performed by: NURSE PRACTITIONER

## 2023-01-31 PROCEDURE — 1160F RVW MEDS BY RX/DR IN RCRD: CPT | Mod: CPTII,S$GLB,, | Performed by: NURSE PRACTITIONER

## 2023-01-31 PROCEDURE — 3078F PR MOST RECENT DIASTOLIC BLOOD PRESSURE < 80 MM HG: ICD-10-PCS | Mod: CPTII,S$GLB,, | Performed by: NURSE PRACTITIONER

## 2023-01-31 PROCEDURE — 87389 HIV-1 AG W/HIV-1&-2 AB AG IA: CPT | Performed by: FAMILY MEDICINE

## 2023-01-31 PROCEDURE — 88175 CYTOPATH C/V AUTO FLUID REDO: CPT | Performed by: NURSE PRACTITIONER

## 2023-01-31 PROCEDURE — 85027 COMPLETE CBC AUTOMATED: CPT | Performed by: FAMILY MEDICINE

## 2023-01-31 PROCEDURE — 80053 COMPREHEN METABOLIC PANEL: CPT | Performed by: FAMILY MEDICINE

## 2023-01-31 PROCEDURE — 99999 PR PBB SHADOW E&M-EST. PATIENT-LVL III: CPT | Mod: PBBFAC,,, | Performed by: NURSE PRACTITIONER

## 2023-01-31 PROCEDURE — 1160F PR REVIEW ALL MEDS BY PRESCRIBER/CLIN PHARMACIST DOCUMENTED: ICD-10-PCS | Mod: CPTII,S$GLB,, | Performed by: NURSE PRACTITIONER

## 2023-01-31 PROCEDURE — 1159F PR MEDICATION LIST DOCUMENTED IN MEDICAL RECORD: ICD-10-PCS | Mod: CPTII,S$GLB,, | Performed by: NURSE PRACTITIONER

## 2023-01-31 PROCEDURE — 99395 PR PREVENTIVE VISIT,EST,18-39: ICD-10-PCS | Mod: S$GLB,,, | Performed by: NURSE PRACTITIONER

## 2023-01-31 PROCEDURE — 3078F DIAST BP <80 MM HG: CPT | Mod: CPTII,S$GLB,, | Performed by: NURSE PRACTITIONER

## 2023-01-31 PROCEDURE — 86803 HEPATITIS C AB TEST: CPT | Performed by: FAMILY MEDICINE

## 2023-01-31 PROCEDURE — 3008F BODY MASS INDEX DOCD: CPT | Mod: CPTII,S$GLB,, | Performed by: NURSE PRACTITIONER

## 2023-01-31 PROCEDURE — 84443 ASSAY THYROID STIM HORMONE: CPT | Performed by: FAMILY MEDICINE

## 2023-01-31 PROCEDURE — 3074F PR MOST RECENT SYSTOLIC BLOOD PRESSURE < 130 MM HG: ICD-10-PCS | Mod: CPTII,S$GLB,, | Performed by: NURSE PRACTITIONER

## 2023-01-31 PROCEDURE — 83036 HEMOGLOBIN GLYCOSYLATED A1C: CPT | Performed by: FAMILY MEDICINE

## 2023-01-31 PROCEDURE — 99999 PR PBB SHADOW E&M-EST. PATIENT-LVL III: ICD-10-PCS | Mod: PBBFAC,,, | Performed by: NURSE PRACTITIONER

## 2023-01-31 PROCEDURE — 84439 ASSAY OF FREE THYROXINE: CPT | Performed by: FAMILY MEDICINE

## 2023-01-31 PROCEDURE — 87624 HPV HI-RISK TYP POOLED RSLT: CPT | Performed by: NURSE PRACTITIONER

## 2023-01-31 PROCEDURE — 36415 COLL VENOUS BLD VENIPUNCTURE: CPT | Mod: PN | Performed by: FAMILY MEDICINE

## 2023-01-31 PROCEDURE — 86376 MICROSOMAL ANTIBODY EACH: CPT | Performed by: FAMILY MEDICINE

## 2023-01-31 PROCEDURE — 1159F MED LIST DOCD IN RCRD: CPT | Mod: CPTII,S$GLB,, | Performed by: NURSE PRACTITIONER

## 2023-01-31 PROCEDURE — 80061 LIPID PANEL: CPT | Performed by: FAMILY MEDICINE

## 2023-01-31 PROCEDURE — 3074F SYST BP LT 130 MM HG: CPT | Mod: CPTII,S$GLB,, | Performed by: NURSE PRACTITIONER

## 2023-01-31 PROCEDURE — 3008F PR BODY MASS INDEX (BMI) DOCUMENTED: ICD-10-PCS | Mod: CPTII,S$GLB,, | Performed by: NURSE PRACTITIONER

## 2023-01-31 NOTE — PROGRESS NOTES
CC: Annual  HPI: Pt is a 37 y.o.  female who presents for routine annual exam. New pt- here to establish care. [facility] around [facility]. She uses NFP for contraception. She does not want STD screening. SA with one partner. History of an ovarian cyst removed at age 21- had left oophorectomy due to this. No known cysts since. Did cocs for 10+ years, did not like them so she stopped. Periods are very regular, q 28-30 days, but very heavy. Gets cramping and urinary symptoms 2 weeks prior to start of menses. Takes Ibuprofen with minimal relief. Gets nausea and cramping the entire time, bleeds x 4-5 days. Does not desire pregnancy. No recent pelvic ultrasound- mother did have fibroids.     FH:   Breast cancer: none  Colon cancer: none  Ovarian cancer: none  Uterine cancer: none    HPV vaccine: yes  Last pap smear: around 2020? Nilm per pt report  History of abnormal pap smears:  yes    Colonoscopy: na   DEXA: na  Mammogram: na  STD history: hpv  Birth control: nfp  OB history: G0  Tobacco use: no    ROS:  GENERAL: Feeling well overall. Denies fever or chills.   SKIN: Denies rash or lesions.   HEAD: Denies head injury or headache.   NODES: Denies enlarged lymph nodes.   CHEST: Denies chest pain or shortness of breath.   CARDIOVASCULAR: Denies palpitations or left sided chest pain.   ABDOMEN: No abdominal pain, constipation, diarrhea, nausea, vomiting or rectal bleeding.   URINARY: No dysuria, hematuria, or burning on urination.  REPRODUCTIVE: See HPI.   BREASTS: Denies pain, lumps, or nipple discharge.   HEMATOLOGIC: No easy bruisability or excessive bleeding.   MUSCULOSKELETAL: Denies joint pain or swelling.   NEUROLOGIC: Denies syncope or weakness.   PSYCHIATRIC: Denies depression, anxiety or mood swings.    PE:   APPEARANCE: Well nourished, well developed, White female in no acute distress.  NODES: no cervical, supraclavicular, or inguinal lymphadenopathy  BREASTS: Symmetrical, no skin changes or  visible lesions. No palpable masses, nipple discharge or adenopathy bilaterally.  ABDOMEN: Soft. No tenderness or masses. No distention. No hernias palpated. No CVA tenderness.  VULVA: No lesions. Normal external female genitalia.  URETHRAL MEATUS: Normal size and location, no lesions, no prolapse.  URETHRA: No masses, tenderness, or prolapse.  VAGINA: Moist. No lesions or lacerations noted. No abnormal discharge present. No odor present.   CERVIX: No lesions or discharge. No cervical motion tenderness.   UTERUS: Normal size, regular shape, mobile, non-tender.  ADNEXA: No tenderness. No fullness or masses palpated in the adnexal regions.   ANUS PERINEUM: Normal.      Diagnosis:  1. Well woman exam with routine gynecological exam    2. Menorrhagia with regular cycle    3. Dysmenorrhea    4. Pap smear for cervical cancer screening        Plan:     Orders Placed This Encounter    HPV High Risk Genotypes, PCR    US Pelvis Comp with Transvag NON-OB (xpd    Liquid-Based Pap Smear, Screening     Pap updated  Mammogram at age 42  Pelvic us  Declines std screening  Recent labs with pcp pending- included thyroid and cbc    Patient was counseled today on the new ACS guidelines for cervical cytology screening as well as the current recommendations for breast cancer screening. She was counseled to follow up with her PCP for other routine health maintenance. Counseling session lasted approximately 10 minutes, and all her questions were answered.  For women over the age of 65, you can stop having cervical cancer screenings if you have never had abnormal cervical cells or cervical cancer, and youve had three negative Pap tests in a row. (You also can stop screening if youve had two negative Pap and HPV tests in a row in the past 10 years, with at least one test in the past 5 years.),    Follow-up with me in 1 year for routine exam; pap in 3 years.     I spent a total of 30 minutes on the day of the visit.This includes face to  face time and non-face to face time preparing to see the patient (eg, review of tests), obtaining and/or reviewing separately obtained history, documenting clinical information in the electronic or other health record, independently interpreting results and communicating results to the patient/family/caregiver, or care coordinator.    As of April 1, 2021, the Cures Act has been passed nationally. This new law requires that all doctors progress notes, lab results, pathology reports and radiology reports be released IMMEDIATELY to the patient in the patient portal. That means that the results are released to you at the EXACT same time they are released to me. Therefore, with all of the patients that I have I am not able to reply to each patient exactly when the results come in. So there will be a delay from when you see the results to when I see them and have time to come up with a response to send you. Also I only see these results when I am on the computer at work. So if the results come in over the weekend or after 5 pm of a work day, I will not see them until the next business day. As you can tell, this is a challenge as a provider to give every patient the quick response they hope for and deserve. So please be patient!     Thanks for your understanding and patience.

## 2023-02-01 ENCOUNTER — NURSE TRIAGE (OUTPATIENT)
Dept: ADMINISTRATIVE | Facility: CLINIC | Age: 38
End: 2023-02-01
Payer: COMMERCIAL

## 2023-02-01 ENCOUNTER — PATIENT OUTREACH (OUTPATIENT)
Dept: ADMINISTRATIVE | Facility: HOSPITAL | Age: 38
End: 2023-02-01
Payer: COMMERCIAL

## 2023-02-01 NOTE — TELEPHONE ENCOUNTER
Mrs. Newell is calling this am s/p pap smear yesterday.   She states she woke up this am with severe lower abdominal pain/cramping, she is feeling nauseated and so weak that she is unable to stand up.  I advised she call 911 to bring her to the ED; she verbalized understanding and stated she would get her boyfriend to drive her to the ED.      Reason for Disposition   Shock suspected (e.g., cold/pale/clammy skin, too weak to stand, low BP, rapid pulse)    Protocols used: Abdominal Pain - Female-A-AH

## 2023-02-03 ENCOUNTER — PATIENT MESSAGE (OUTPATIENT)
Dept: PRIMARY CARE CLINIC | Facility: CLINIC | Age: 38
End: 2023-02-03
Payer: COMMERCIAL

## 2023-02-03 DIAGNOSIS — R79.89 ABNORMAL THYROID BLOOD TEST: Primary | ICD-10-CM

## 2023-02-06 ENCOUNTER — HOSPITAL ENCOUNTER (OUTPATIENT)
Dept: RADIOLOGY | Facility: OTHER | Age: 38
Discharge: HOME | End: 2023-02-06
Attending: FAMILY MEDICINE
Payer: COMMERCIAL

## 2023-02-06 DIAGNOSIS — R59.1 LYMPHADENOPATHY: ICD-10-CM

## 2023-02-06 PROCEDURE — 76536 US EXAM OF HEAD AND NECK: CPT | Mod: 26,,, | Performed by: RADIOLOGY

## 2023-02-06 PROCEDURE — 76536 US EXAM OF HEAD AND NECK: CPT | Mod: TC

## 2023-02-06 PROCEDURE — 76536 US SOFT TISSUE HEAD NECK THYROID: ICD-10-PCS | Mod: 26,,, | Performed by: RADIOLOGY

## 2023-02-07 ENCOUNTER — PATIENT MESSAGE (OUTPATIENT)
Dept: PRIMARY CARE CLINIC | Facility: CLINIC | Age: 38
End: 2023-02-07
Payer: COMMERCIAL

## 2023-03-23 ENCOUNTER — E-VISIT (OUTPATIENT)
Dept: PRIMARY CARE CLINIC | Facility: CLINIC | Age: 38
End: 2023-03-23
Attending: FAMILY MEDICINE
Payer: COMMERCIAL

## 2023-03-23 ENCOUNTER — PATIENT MESSAGE (OUTPATIENT)
Dept: PRIMARY CARE CLINIC | Facility: CLINIC | Age: 38
End: 2023-03-23

## 2023-03-23 VITALS
BODY MASS INDEX: 28.08 KG/M2 | OXYGEN SATURATION: 99 % | SYSTOLIC BLOOD PRESSURE: 132 MMHG | WEIGHT: 189.63 LBS | DIASTOLIC BLOOD PRESSURE: 88 MMHG | HEIGHT: 69 IN | HEART RATE: 104 BPM

## 2023-03-23 DIAGNOSIS — R79.89 ABNORMAL THYROID BLOOD TEST: ICD-10-CM

## 2023-03-23 DIAGNOSIS — J06.9 UPPER RESPIRATORY TRACT INFECTION, UNSPECIFIED TYPE: Primary | ICD-10-CM

## 2023-03-23 LAB
CTP QC/QA: YES
CTP QC/QA: YES
POC MOLECULAR INFLUENZA A AGN: NEGATIVE
POC MOLECULAR INFLUENZA B AGN: NEGATIVE
SARS-COV-2 AG RESP QL IA.RAPID: NEGATIVE

## 2023-03-23 PROCEDURE — 87502 INFLUENZA DNA AMP PROBE: CPT | Mod: QW,S$GLB,, | Performed by: FAMILY MEDICINE

## 2023-03-23 PROCEDURE — 99499 NO LOS: ICD-10-PCS | Mod: 95,,, | Performed by: FAMILY MEDICINE

## 2023-03-23 PROCEDURE — 3075F SYST BP GE 130 - 139MM HG: CPT | Mod: CPTII,S$GLB,, | Performed by: FAMILY MEDICINE

## 2023-03-23 PROCEDURE — 99214 OFFICE O/P EST MOD 30 MIN: CPT | Mod: S$GLB,,, | Performed by: FAMILY MEDICINE

## 2023-03-23 PROCEDURE — 99999 PR PBB SHADOW E&M-EST. PATIENT-LVL IV: CPT | Mod: PBBFAC,,, | Performed by: FAMILY MEDICINE

## 2023-03-23 PROCEDURE — 87811 SARS-COV-2 COVID19 W/OPTIC: CPT | Mod: QW,S$GLB,, | Performed by: FAMILY MEDICINE

## 2023-03-23 PROCEDURE — 1160F RVW MEDS BY RX/DR IN RCRD: CPT | Mod: CPTII,S$GLB,, | Performed by: FAMILY MEDICINE

## 2023-03-23 PROCEDURE — 3008F PR BODY MASS INDEX (BMI) DOCUMENTED: ICD-10-PCS | Mod: CPTII,S$GLB,, | Performed by: FAMILY MEDICINE

## 2023-03-23 PROCEDURE — 1159F MED LIST DOCD IN RCRD: CPT | Mod: CPTII,S$GLB,, | Performed by: FAMILY MEDICINE

## 2023-03-23 PROCEDURE — 3075F PR MOST RECENT SYSTOLIC BLOOD PRESS GE 130-139MM HG: ICD-10-PCS | Mod: CPTII,S$GLB,, | Performed by: FAMILY MEDICINE

## 2023-03-23 PROCEDURE — 99214 PR OFFICE/OUTPT VISIT, EST, LEVL IV, 30-39 MIN: ICD-10-PCS | Mod: S$GLB,,, | Performed by: FAMILY MEDICINE

## 2023-03-23 PROCEDURE — 87502 POCT INFLUENZA A/B MOLECULAR: ICD-10-PCS | Mod: QW,S$GLB,, | Performed by: FAMILY MEDICINE

## 2023-03-23 PROCEDURE — 3079F DIAST BP 80-89 MM HG: CPT | Mod: CPTII,S$GLB,, | Performed by: FAMILY MEDICINE

## 2023-03-23 PROCEDURE — 3044F PR MOST RECENT HEMOGLOBIN A1C LEVEL <7.0%: ICD-10-PCS | Mod: CPTII,S$GLB,, | Performed by: FAMILY MEDICINE

## 2023-03-23 PROCEDURE — 3008F BODY MASS INDEX DOCD: CPT | Mod: CPTII,S$GLB,, | Performed by: FAMILY MEDICINE

## 2023-03-23 PROCEDURE — 99999 PR PBB SHADOW E&M-EST. PATIENT-LVL IV: ICD-10-PCS | Mod: PBBFAC,,, | Performed by: FAMILY MEDICINE

## 2023-03-23 PROCEDURE — 3079F PR MOST RECENT DIASTOLIC BLOOD PRESSURE 80-89 MM HG: ICD-10-PCS | Mod: CPTII,S$GLB,, | Performed by: FAMILY MEDICINE

## 2023-03-23 PROCEDURE — 99499 UNLISTED E&M SERVICE: CPT | Mod: 95,,, | Performed by: FAMILY MEDICINE

## 2023-03-23 PROCEDURE — 3044F HG A1C LEVEL LT 7.0%: CPT | Mod: CPTII,S$GLB,, | Performed by: FAMILY MEDICINE

## 2023-03-23 PROCEDURE — 87811 SARS CORONAVIRUS 2 ANTIGEN POCT, MANUAL READ: ICD-10-PCS | Mod: QW,S$GLB,, | Performed by: FAMILY MEDICINE

## 2023-03-23 PROCEDURE — 1160F PR REVIEW ALL MEDS BY PRESCRIBER/CLIN PHARMACIST DOCUMENTED: ICD-10-PCS | Mod: CPTII,S$GLB,, | Performed by: FAMILY MEDICINE

## 2023-03-23 PROCEDURE — 1159F PR MEDICATION LIST DOCUMENTED IN MEDICAL RECORD: ICD-10-PCS | Mod: CPTII,S$GLB,, | Performed by: FAMILY MEDICINE

## 2023-03-23 RX ORDER — METHYLPREDNISOLONE 4 MG/1
TABLET ORAL
Qty: 21 EACH | Refills: 0 | Status: SHIPPED | OUTPATIENT
Start: 2023-03-23 | End: 2023-04-13

## 2023-03-23 NOTE — PROGRESS NOTES
"FAMILY MEDICINE  [facility]    Reason for visit:   Chief Complaint   Patient presents with    Cough    URI       HPI: Terri Newell is a 37 y.o. female  - with history of right oophorectomy presents for feeling ill    [facility]    Terri Newell reports that she started to feel ill 3/21/23. She has had several bouts of URI's this year and 2 bouts of bronchitis. She reports that she was treated with steroids once when was helpful for her bronchitis. She reports sore throat, fatigue, muscle aches, rhinorrhea and cough. She denies any fevers. She has not taken a home Covid-19 test. She has been around other people with URI's recently and was traveling for work.         Review of Systems   All other systems reviewed and are negative.    Social History     Social History Narrative    Single. WSM with monogamous partner since 2019. 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 Previously lived in [place] but has been in [facility] since 2007         ALLERGIES:   Review of patient's allergies indicates:  No Known Allergies    MEDS:   No current outpatient medications on file as of 3/23/2023.     No current facility-administered medications on file as of 3/23/2023.       Vital signs:   Vitals:    03/23/23 1325   BP: 132/88   BP Location: Left arm   Patient Position: Sitting   BP Method: Medium (Manual)   Pulse: 104   SpO2: 99%   Weight: 86 kg (189 lb 9.5 oz)   Height: 5' 9" (1.753 m)     Body mass index is 28 kg/m².    PHYSICAL EXAM:     Physical Exam  Constitutional:       General: She is not in acute distress.  HENT:      Nose: Rhinorrhea present. Rhinorrhea is clear.      Right Sinus: No maxillary sinus tenderness or frontal sinus tenderness.      Left Sinus: No maxillary sinus tenderness or frontal sinus tenderness.   Cardiovascular:      Rate and Rhythm: Normal rate and regular rhythm.      Heart sounds: Normal heart sounds. No murmur heard.    No friction rub. No " gallop.   Pulmonary:      Effort: Pulmonary effort is normal.      Breath sounds: Normal breath sounds. No wheezing, rhonchi or rales.   Musculoskeletal:      Cervical back: Neck supple.      Right lower leg: No edema.      Left lower leg: No edema.   Lymphadenopathy:      Cervical: No cervical adenopathy.   Neurological:      Mental Status: She is alert.         PERTINENT RESULTS:   Office Visit on 03/23/2023   Component Date Value Ref Range Status    SARS Coronavirus 2 Antigen 03/23/2023 Negative  Negative Final     Acceptable 03/23/2023 Yes   Final    POC Molecular Influenza A Ag 03/23/2023 Negative  Negative, Not Reported Final    POC Molecular Influenza B Ag 03/23/2023 Negative  Negative, Not Reported Final     Acceptable 03/23/2023 Yes   Final       ASSESSMENT/PLAN:    1. Upper respiratory tract infection, unspecified type  Overview:  - flu and covid-19 testing negative  - discussed suspect viral URI  - no signs of bronchitis at this time. She is familiar with symptoms since she has had in past and okay to start steroids if symptoms of bronchitis but encouraged her to avoid if symptoms remain upper respiratory  - supportive care  - questions elicited and answered  - encouraged to patient to notify me of any questions or concerns      Orders:  -     SARS Coronavirus 2 Antigen, POCT Manual Read  -     POCT Influenza A/B Molecular  -     methylPREDNISolone (MEDROL DOSEPACK) 4 mg tablet; use as directed  Dispense: 21 each; Refill: 0    2. Abnormal thyroid blood test  Overview:  - 1/31/23 TPO 88 elevated  Lab Results   Component Value Date    TSH 0.924 01/31/2023    FREET4 0.94 01/31/2023     - recommend add selenium   - recommend repeat last in 1-3 months          Follow-up as needed if no improvement    This note is dictated using the M*Modal Fluency Direct word recognition program. There are word recognition mistakes that are occasionally missed on review.    Dr. Carissa Calabrese D.O.    Family Medicine

## 2023-03-23 NOTE — PATIENT INSTRUCTIONS
1. Rest  2. Lots of fluids: water and soups. Avoid sugar drinks and juices  3. Vitamin C 1000 mg daily and Zinc 50 mg daily  4. Over the counter medication like   - cough syrup Delsym as needed  - Vicks vapor on your chest  5. Ibuprofen 600 mg every 6 hours (take with food)  muscle aches, fever and sore throat  6. You can also alternate with Acetaminophen 500 mg three times a day can also be used for muscle aches, fever and sore throat  7. Notify me if fever >3 days, fever that resolves and then returns, shortness of breath, chest pain, severe headache or any other concerns

## 2023-03-23 NOTE — PROGRESS NOTES
Patient ID: Terri Newell is a 37 y.o. female.    Chief Complaint: URI    The patient initiated a request through [employer] on 3/23/2023 for evaluation and management with a chief complaint of URI     I evaluated the questionnaire submission on 3/23/23.    [facility] Peq Evisit Upper Respitatory/Cough Questionnaire    3/23/2023  8:44 AM CDT - Filed by Patient   Do you agree to participate in an E-Visit? Yes   If you have any of the following symptoms, please present to your local ER or call 911:  I acknowledge   What is the main issue that you would like for your doctor to address today? Cough/chest congestion   Are you able to take your vital signs? No   Are you currently pregnant, could you be pregnant, or are you breast feeding? None of the above   What symptoms do you currently have?  Cough;  Headache;  Sore throat   Have you had a fever? No   When did your symptoms first appear? 3/22/2023   In the last two weeks, have you been in close contact with someone who has COVID-19 or the Flu? No   In the last two weeks, have you worked or volunteered in a healthcare facility or as a ? Healthcare facilities include a hospital, medical or dental clinic, long-term care facility, or nursing home No   Do you live in a long-term care facility, nursing home, group home, or homeless shelter? No   List what you have done or taken to help your symptoms. Ibuprofen, liquids   How severe are your symptoms? Mild   Have you taken an at home Covid test? No   Have you taken a Flu test? No   Have you been fully vaccinated for COVID? (2 Pfizer, 2 Moderna or 1 Jamie & Jamie vaccine injections) Yes   Have you received a booster? Yes   Have you recieved a Flu shot? No   Do you have transportation to get tested for COVID if it is indicated and ordered for you at an [facility] location? Yes   Provide any information you feel is important to your history not asked above I had bronchitis twice in the past 2 months   Please attach  any relevant images or files           Patient Active Problem List   Diagnosis    Lymphadenopathy    History of oophorectomy, unilateral    Dysmenorrhea    Menorrhagia with regular cycle    Fatigue    Abnormal thyroid blood test       Vital signs: see above     Recent Labs Obtained:  none    1. Upper respiratory tract infection, unspecified type        E-Visit Time Tracking:    Day 1 Time (in minutes): 5     Total Time (in minutes): 5      Dr. Carissa Calabrese D.O.   Family Medicine

## 2023-03-23 NOTE — TELEPHONE ENCOUNTER
Please add Terri Newell on for today 3/23/23 at 1:30 PM RICHARDSON Calabrese D.O.   Family Medicine

## 2023-03-29 ENCOUNTER — PATIENT MESSAGE (OUTPATIENT)
Dept: PRIMARY CARE CLINIC | Facility: CLINIC | Age: 38
End: 2023-03-29
Payer: COMMERCIAL

## 2024-05-09 ENCOUNTER — OFFICE VISIT (OUTPATIENT)
Dept: PRIMARY CARE CLINIC | Facility: CLINIC | Age: 39
End: 2024-05-09
Attending: FAMILY MEDICINE
Payer: COMMERCIAL

## 2024-05-09 VITALS
SYSTOLIC BLOOD PRESSURE: 132 MMHG | OXYGEN SATURATION: 96 % | HEIGHT: 69 IN | RESPIRATION RATE: 15 BRPM | HEART RATE: 91 BPM | DIASTOLIC BLOOD PRESSURE: 88 MMHG | BODY MASS INDEX: 28 KG/M2 | WEIGHT: 189.06 LBS

## 2024-05-09 DIAGNOSIS — L30.9 DERMATITIS: ICD-10-CM

## 2024-05-09 DIAGNOSIS — Z13.1 SCREENING FOR DIABETES MELLITUS (DM): ICD-10-CM

## 2024-05-09 DIAGNOSIS — E66.3 OVERWEIGHT (BMI 25.0-29.9): ICD-10-CM

## 2024-05-09 DIAGNOSIS — L98.9 SKIN LESION: ICD-10-CM

## 2024-05-09 DIAGNOSIS — Z13.6 ENCOUNTER FOR LIPID SCREENING FOR CARDIOVASCULAR DISEASE: ICD-10-CM

## 2024-05-09 DIAGNOSIS — E28.2 PCOS (POLYCYSTIC OVARIAN SYNDROME): ICD-10-CM

## 2024-05-09 DIAGNOSIS — Z13.228 SCREENING FOR METABOLIC DISORDER: ICD-10-CM

## 2024-05-09 DIAGNOSIS — R40.0 DAYTIME SLEEPINESS: ICD-10-CM

## 2024-05-09 DIAGNOSIS — R79.89 ABNORMAL THYROID BLOOD TEST: ICD-10-CM

## 2024-05-09 DIAGNOSIS — Z13.0 SCREENING, IRON DEFICIENCY ANEMIA: ICD-10-CM

## 2024-05-09 DIAGNOSIS — K21.9 GASTROESOPHAGEAL REFLUX DISEASE WITHOUT ESOPHAGITIS: Primary | ICD-10-CM

## 2024-05-09 DIAGNOSIS — Z13.220 ENCOUNTER FOR LIPID SCREENING FOR CARDIOVASCULAR DISEASE: ICD-10-CM

## 2024-05-09 DIAGNOSIS — F41.9 ANXIETY: ICD-10-CM

## 2024-05-09 DIAGNOSIS — G47.09 OTHER INSOMNIA: ICD-10-CM

## 2024-05-09 PROBLEM — J06.9 UPPER RESPIRATORY TRACT INFECTION: Status: RESOLVED | Noted: 2023-03-23 | Resolved: 2024-05-09

## 2024-05-09 PROBLEM — R59.1 LYMPHADENOPATHY: Status: RESOLVED | Noted: 2023-01-30 | Resolved: 2024-05-09

## 2024-05-09 PROCEDURE — 3079F DIAST BP 80-89 MM HG: CPT | Mod: CPTII,S$GLB,, | Performed by: FAMILY MEDICINE

## 2024-05-09 PROCEDURE — 1159F MED LIST DOCD IN RCRD: CPT | Mod: CPTII,S$GLB,, | Performed by: FAMILY MEDICINE

## 2024-05-09 PROCEDURE — 3008F BODY MASS INDEX DOCD: CPT | Mod: CPTII,S$GLB,, | Performed by: FAMILY MEDICINE

## 2024-05-09 PROCEDURE — 3075F SYST BP GE 130 - 139MM HG: CPT | Mod: CPTII,S$GLB,, | Performed by: FAMILY MEDICINE

## 2024-05-09 PROCEDURE — 99999 PR PBB SHADOW E&M-EST. PATIENT-LVL IV: CPT | Mod: PBBFAC,,, | Performed by: FAMILY MEDICINE

## 2024-05-09 PROCEDURE — 99215 OFFICE O/P EST HI 40 MIN: CPT | Mod: S$GLB,,, | Performed by: FAMILY MEDICINE

## 2024-05-09 RX ORDER — PANTOPRAZOLE SODIUM 40 MG/1
40 TABLET, DELAYED RELEASE ORAL DAILY
Qty: 30 TABLET | Refills: 0 | Status: SHIPPED | OUTPATIENT
Start: 2024-05-09 | End: 2024-06-11 | Stop reason: SDUPTHER

## 2024-05-09 RX ORDER — BUSPIRONE HYDROCHLORIDE 5 MG/1
5 TABLET ORAL 2 TIMES DAILY
Qty: 60 TABLET | Refills: 2 | Status: SHIPPED | OUTPATIENT
Start: 2024-05-09 | End: 2024-08-07

## 2024-05-09 RX ORDER — CLOBETASOL PROPIONATE 0.5 MG/G
CREAM TOPICAL 2 TIMES DAILY
Qty: 30 G | Refills: 5 | Status: SHIPPED | OUTPATIENT
Start: 2024-05-09

## 2024-05-09 NOTE — PROGRESS NOTES
FAMILY MEDICINE  [facility]    Reason for visit:   Chief Complaint   Patient presents with    Anxiety    Gastroesophageal Reflux    Rash    Insomnia    Concern for weight       HPI: Terri Newell is a 38 y.o. female  - with history of right oophorectomy presents for several concerns.  She is concerned for heartburn, obesity with possible PCOS, rash on her left hand, difficulty sleeping, and anxiety    Gyne[facility]    1. GERD     Terri Newell reports that she has been suffering from heartburn symptoms for last 4-5 years.  She knows her triggers and typically they are stress related and food (dairy, fried foods, heavy meals and coffee).  She reports she is taken over-the-counter omeprazole as needed but it has never completely resolved.  She is never taken omeprazole consistently.  She denies any unintentional weight loss, melena, hematochezia, nausea or vomiting.  She reports her father also has GERD.  She has never had an endoscopy or formal evaluation.  She has never had H pylori testing.    2. Obesity/PCOS    Terri Newell continues to struggle with her weight.  She is concerned that she is PCOS.  She does have a history of bilateral ovarian cysts and had an oophorectomy when she was a teenager.  She is painful menstrual cycles that are regular.  She is difficulty managing her weight.  She also has hirsutism she reports that she has ever been formally diagnosed with PCOS.    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 Rash and skin lesions    Terri Newell also reports concerns for rash on her right hand.  She reports her mother has psoriasis since she was unsure if it was also psoriasis.  She reports initially started on the dorsal surface of her hand and now it is just localized to the dorsal PIP 4th finger.  She reports that it is erythematous patch.  She reports that it is painful at times.  She is used over-the-counter cortisone which at times is mildly helpful.  She also uses a over-the-counter cream that seems to be helpful.  It has not completely resolved.  She does have wash her hands frequently (for her job) which seems to exacerbate the symptoms    She was also noticed some new skin lesions mainly on her arms that she would like evaluated.    4. Sleep issues    Terri Newell reports that she has already had sleep issues.  She reports that she is difficulty falling asleep and she wakes up frequently.  She does feel very stressed prior to sleep in his seems to be worsening over the years.  She reports that she has anxiety and feels that her heart is pounding her mind is racing prior to going to bed.  She will start to wake up starting at 2:00 a.m. for every few minutes.  She does have to wake up early for her job at 4:00 a.m..  She wonders if that contributes to the symptoms.  She also has been waking up nightly at 3:00 a.m. to urinate.  She has never been on anxiolytic medications.  She does notice a through the day she seems more irritable.    Her father does have sleep apnea.  She has never had a sleep study.  She does have some mild snoring but not nightly per her partner.              Review of Systems   All other systems reviewed and are negative.      Social History     Social History Narrative    Single. [employer] with monogamous partner since 2019. 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 Previously lived  "in [place] but has been in [place] [facility] since 2007         ALLERGIES:   Review of patient's allergies indicates:  No Known Allergies    MEDS:   Outpatient Medications as of 5/9/2024   Medication Sig Dispense Refill    busPIRone (BUSPAR) 5 MG Tab Take 1 tablet (5 mg total) by mouth 2 (two) times daily. 60 tablet 2    clobetasoL (TEMOVATE) 0.05 % cream Apply topically 2 (two) times daily. 30 g 5    pantoprazole (PROTONIX) 40 MG tablet Take 1 tablet (40 mg total) by mouth once daily. 30 tablet 0     No current facility-administered medications on file as of 5/9/2024.       Vital signs:   Vitals:    05/09/24 1246   BP: 132/88   BP Location: Right arm   Patient Position: Sitting   BP Method: Medium (Manual)   Pulse: 91   Resp: 15   SpO2: 96%   Weight: 85.7 kg (189 lb 0.7 oz)   Height: 5' 9" (1.753 m)       Body mass index is 27.92 kg/m².    PHYSICAL EXAM:     Physical Exam  Vitals reviewed.   Constitutional:       General: She is not in acute distress.  HENT:      Head: Normocephalic and atraumatic.   Eyes:      General: No scleral icterus.     Conjunctiva/sclera: Conjunctivae normal.   Neck:      Thyroid: No thyromegaly.      Vascular: No carotid bruit.   Cardiovascular:      Rate and Rhythm: Normal rate and regular rhythm.      Heart sounds: Normal heart sounds. No murmur heard.     No friction rub. No gallop.   Pulmonary:      Effort: Pulmonary effort is normal.      Breath sounds: Normal breath sounds. No wheezing, rhonchi or rales.   Abdominal:      General: Bowel sounds are normal. There is no distension.      Palpations: Abdomen is soft.      Tenderness: There is no abdominal tenderness.   Musculoskeletal:      Cervical back: Normal range of motion and neck supple.      Right lower leg: No edema.      Left lower leg: No edema.   Lymphadenopathy:      Cervical: No cervical adenopathy.   Skin:     General: Skin is warm.      Capillary Refill: Capillary refill takes less than 2 seconds.   Neurological:      Mental Status: She " is alert.           PERTINENT RESULTS:   No visits with results within 1 Week(s) from this visit.   Latest known visit with results is:   Office Visit on 03/23/2023   Component Date Value Ref Range Status    SARS Coronavirus 2 Antigen 03/23/2023 Negative  Negative Final     Acceptable 03/23/2023 Yes   Final    POC Molecular Influenza A Ag 03/23/2023 Negative  Negative, Not Reported Final    POC Molecular Influenza B Ag 03/23/2023 Negative  Negative, Not Reported Final     Acceptable 03/23/2023 Yes   Final       ASSESSMENT/PLAN:    1. Gastroesophageal reflux disease without esophagitis  Overview:  - counseling on management of GERD   -recommend GI evaluation since it has been 5 years that she has been struggling with symptoms.  She may need an EGD  -recommend start pantoprazole 40 mg daily for 1 month and then so she over-the-counter antacid as needed  - counseling on dietary changes    Orders:  -     CBC Auto Differential; Future; Expected date: 05/09/2024  -     pantoprazole (PROTONIX) 40 MG tablet; Take 1 tablet (40 mg total) by mouth once daily.  Dispense: 30 tablet; Refill: 0  -     Ambulatory referral/consult to Gastroenterology; Future; Expected date: 05/16/2024    2. Anxiety  Overview:  - counseling on management of anxiety   -discuss treatment regimens including SSRIs though I am concerned with her struggle with weight that SSRIs may exacerbate that   -discussed starting asleep aid vs starting buspirone  - she opted for buspirone  -recommend buspirone 5 mg twice a day and up titrated as needed/as tolerated  - counseling regarding new medication including expected results, potential side effects, and appropriate use.  - questions elicited and answered  - encouraged to patient to notify me of any questions or concerns    Orders:  -     busPIRone (BUSPAR) 5 MG Tab; Take 1 tablet (5 mg total) by mouth 2 (two) times daily.  Dispense: 60 tablet; Refill: 2    3. Other  insomnia  Overview:  - recommend evaluation by Sleep management   -will focus on managing anxiety may consider sleep aid in the future however she would like to avoid sleep aids because she was to get up so early in the morning for her job    Orders:  -     Ambulatory referral/consult to Sleep Disorders; Future; Expected date: 05/16/2024  -     busPIRone (BUSPAR) 5 MG Tab; Take 1 tablet (5 mg total) by mouth 2 (two) times daily.  Dispense: 60 tablet; Refill: 2    4. Daytime sleepiness  -     Ambulatory referral/consult to Sleep Disorders; Future; Expected date: 05/16/2024    5. PCOS (polycystic ovarian syndrome)  Overview:  - ovarian cyst status post unilateral oophorectomy , hirsutism, dysmenorrhea, central obesity   -I do suspect she has PCOS   -counseled on management of PCOS     Orders:  -     Hemoglobin A1C; Future; Expected date: 05/09/2024    6. Overweight (BMI 25.0-29.9)  Overview:  - discussed recommendation for diet and cardiovascular exercise  - counseling on lifestyle modifications for risk factor reduction  - counseling on management options        7. Abnormal thyroid blood test  Overview:  - 1/31/23 TPO 88 elevated  Lab Results   Component Value Date    TSH 0.924 01/31/2023    FREET4 0.94 01/31/2023     - recommend repeat thyroid labs      Orders:  -     T3, Free; Future; Expected date: 06/09/2024  -     T4, Free; Future; Expected date: 06/09/2024  -     Thyroid Peroxidase Antibody; Future; Expected date: 06/09/2024  -     TSH; Future; Expected date: 06/09/2024    8. Dermatitis  Overview:  - eczema vs psorasis  - recommend good hydration and skin protection   -trial clobetasol 0.05% BID    Orders:  -     clobetasoL (TEMOVATE) 0.05 % cream; Apply topically 2 (two) times daily.  Dispense: 30 g; Refill: 5    9. Skin lesion  -     Ambulatory referral/consult to Dermatology; Future; Expected date: 05/16/2024    10. Encounter for lipid screening for cardiovascular disease  -     Lipid Panel; Future; Expected  date: 05/09/2024    11. Screening, iron deficiency anemia  -     CBC Auto Differential; Future; Expected date: 05/09/2024    12. Screening for diabetes mellitus (DM)  -     Hemoglobin A1C; Future; Expected date: 05/09/2024    13. Screening for metabolic disorder  -     Comprehensive Metabolic Panel; Future; Expected date: 05/09/2024      Follow-up as needed if no improvement    This note is dictated using the M*Modal Fluency Direct word recognition program. There are word recognition mistakes that are occasionally missed on review.    Dr. Carissa Calabrese D.O.   [facility]

## 2024-05-14 ENCOUNTER — LAB VISIT (OUTPATIENT)
Dept: LAB | Facility: OTHER | Age: 39
End: 2024-05-14
Attending: FAMILY MEDICINE
Payer: COMMERCIAL

## 2024-05-14 DIAGNOSIS — Z13.220 ENCOUNTER FOR LIPID SCREENING FOR CARDIOVASCULAR DISEASE: ICD-10-CM

## 2024-05-14 DIAGNOSIS — E28.2 PCOS (POLYCYSTIC OVARIAN SYNDROME): ICD-10-CM

## 2024-05-14 DIAGNOSIS — K21.9 GASTROESOPHAGEAL REFLUX DISEASE WITHOUT ESOPHAGITIS: ICD-10-CM

## 2024-05-14 DIAGNOSIS — Z13.6 ENCOUNTER FOR LIPID SCREENING FOR CARDIOVASCULAR DISEASE: ICD-10-CM

## 2024-05-14 DIAGNOSIS — Z13.1 SCREENING FOR DIABETES MELLITUS (DM): ICD-10-CM

## 2024-05-14 DIAGNOSIS — Z13.228 SCREENING FOR METABOLIC DISORDER: ICD-10-CM

## 2024-05-14 DIAGNOSIS — Z13.0 SCREENING, IRON DEFICIENCY ANEMIA: ICD-10-CM

## 2024-05-14 LAB
ALBUMIN SERPL BCP-MCNC: 3.9 G/DL (ref 3.5–5.2)
ALP SERPL-CCNC: 81 U/L (ref 55–135)
ALT SERPL W/O P-5'-P-CCNC: 29 U/L (ref 10–44)
ANION GAP SERPL CALC-SCNC: 9 MMOL/L (ref 8–16)
AST SERPL-CCNC: 17 U/L (ref 10–40)
BASOPHILS # BLD AUTO: 0.05 K/UL (ref 0–0.2)
BASOPHILS NFR BLD: 0.6 % (ref 0–1.9)
BILIRUB SERPL-MCNC: 0.3 MG/DL (ref 0.1–1)
BUN SERPL-MCNC: 11 MG/DL (ref 6–20)
CALCIUM SERPL-MCNC: 9.2 MG/DL (ref 8.7–10.5)
CHLORIDE SERPL-SCNC: 111 MMOL/L (ref 95–110)
CHOLEST SERPL-MCNC: 171 MG/DL (ref 120–199)
CHOLEST/HDLC SERPL: 3.7 {RATIO} (ref 2–5)
CO2 SERPL-SCNC: 19 MMOL/L (ref 23–29)
CREAT SERPL-MCNC: 0.7 MG/DL (ref 0.5–1.4)
DIFFERENTIAL METHOD BLD: NORMAL
EOSINOPHIL # BLD AUTO: 0.4 K/UL (ref 0–0.5)
EOSINOPHIL NFR BLD: 4.8 % (ref 0–8)
ERYTHROCYTE [DISTWIDTH] IN BLOOD BY AUTOMATED COUNT: 13 % (ref 11.5–14.5)
EST. GFR  (NO RACE VARIABLE): >60 ML/MIN/1.73 M^2
ESTIMATED AVG GLUCOSE: 111 MG/DL (ref 68–131)
GLUCOSE SERPL-MCNC: 102 MG/DL (ref 70–110)
HBA1C MFR BLD: 5.5 % (ref 4–5.6)
HCT VFR BLD AUTO: 40 % (ref 37–48.5)
HDLC SERPL-MCNC: 46 MG/DL (ref 40–75)
HDLC SERPL: 26.9 % (ref 20–50)
HGB BLD-MCNC: 13.1 G/DL (ref 12–16)
IMM GRANULOCYTES # BLD AUTO: 0.02 K/UL (ref 0–0.04)
IMM GRANULOCYTES NFR BLD AUTO: 0.2 % (ref 0–0.5)
LDLC SERPL CALC-MCNC: 113.4 MG/DL (ref 63–159)
LYMPHOCYTES # BLD AUTO: 2.3 K/UL (ref 1–4.8)
LYMPHOCYTES NFR BLD: 26.8 % (ref 18–48)
MCH RBC QN AUTO: 29 PG (ref 27–31)
MCHC RBC AUTO-ENTMCNC: 32.8 G/DL (ref 32–36)
MCV RBC AUTO: 89 FL (ref 82–98)
MONOCYTES # BLD AUTO: 0.5 K/UL (ref 0.3–1)
MONOCYTES NFR BLD: 6.1 % (ref 4–15)
NEUTROPHILS # BLD AUTO: 5.2 K/UL (ref 1.8–7.7)
NEUTROPHILS NFR BLD: 61.5 % (ref 38–73)
NONHDLC SERPL-MCNC: 125 MG/DL
NRBC BLD-RTO: 0 /100 WBC
PLATELET # BLD AUTO: 246 K/UL (ref 150–450)
PMV BLD AUTO: 9.2 FL (ref 9.2–12.9)
POTASSIUM SERPL-SCNC: 4.1 MMOL/L (ref 3.5–5.1)
PROT SERPL-MCNC: 7.1 G/DL (ref 6–8.4)
RBC # BLD AUTO: 4.52 M/UL (ref 4–5.4)
SODIUM SERPL-SCNC: 139 MMOL/L (ref 136–145)
TRIGL SERPL-MCNC: 58 MG/DL (ref 30–150)
WBC # BLD AUTO: 8.4 K/UL (ref 3.9–12.7)

## 2024-05-14 PROCEDURE — 83036 HEMOGLOBIN GLYCOSYLATED A1C: CPT | Performed by: FAMILY MEDICINE

## 2024-05-14 PROCEDURE — 36415 COLL VENOUS BLD VENIPUNCTURE: CPT | Performed by: FAMILY MEDICINE

## 2024-05-14 PROCEDURE — 85025 COMPLETE CBC W/AUTO DIFF WBC: CPT | Performed by: FAMILY MEDICINE

## 2024-05-14 PROCEDURE — 80061 LIPID PANEL: CPT | Performed by: FAMILY MEDICINE

## 2024-05-14 PROCEDURE — 80053 COMPREHEN METABOLIC PANEL: CPT | Performed by: FAMILY MEDICINE

## 2024-06-10 NOTE — PROGRESS NOTES
FAMILY MEDICINE  [facility]    Reason for visit:   Chief Complaint   Patient presents with    1 MONTH F/U    Anxiety    Insomnia    Gastroesophageal Reflux         SUBJECTIVE: Terri Newell is a 38 y.o. female  - with history of right oophorectomy presents for  follow-up anxiety, insomnia and GERD    1. Anxiety with insomnia    Age diagnosed: 39 yo     Today 06/11/2024:  Terri Newell  reports that she has been feeling much better since her last visit.  She is scheduled to see sleep medicine next month.    She reports that she started buspirone 5 mg prior to bedtime.  She reports she is taking it about 3 days a week and reports it is significantly has helped with her sleep.  She also suspects that treatment of her GERD has also improved her sleep.  She reports with the increased restfulness she has had less anxiety.  She did try to take the medication once at work however it caused her to feel lightheaded and disease so she opted not to continue the medication during the day.  Overall she is pleased.      Prior notes:  5/14/24: Terri Newell reports that she has already had sleep issues.  She reports that she is difficulty falling asleep and she wakes up frequently.  She does feel very stressed prior to sleep in his seems to be worsening over the years.  She reports that she has anxiety and feels that her heart is pounding her mind is racing prior to going to bed.  She will start to wake up starting at 2:00 a.m. for every few minutes.  She does have to wake up early for her job at 4:00 a.m..  She wonders if that contributes to the symptoms.  She also has been waking up nightly at 3:00 a.m. to urinate.  She has never been on anxiolytic medications.  She does notice a through the day she seems more irritable.  Her father does have sleep apnea.  She has never had a sleep study.  She does have some mild snoring but not nightly per her partner    Panic attacks: denies  Hopelessness:   denies  Sleep issues:  yes  Suicidal thoughts:  denies  Thoughts of self harm: denies  Thoughts of harm to others:  denies  History of suicide attempts:  denies  Family history of suicide: denies    Psychiatrist: none  Psychologist: none  Counselor : none    Prior medication: none    Current medications:   busPIRone (BUSPAR) 5 MG Tab, Take 1 tablet (5 mg total) by mouth 2 (two) times daily., Disp: 60 tablet, Rfl: 2    Side effects of current treatment: dizziness when took in daytime    Support system: family      1. GERD     Since last visit 5/9/24 Terri Newell   Started pantoprazole 40 mg daily.  She reports since starting the medication it has significantly improved her symptoms.  She is able to tolerate foods that she was not able to tolerate before.  Her sleep has also improved and she suspects that it was related to GERD symptoms waking her up through the night.  She has never had an endoscopy.  She does  have an appointment with Gastroenterology that has been scheduled.    The patient's last visit with me was on 5/9/2024:  Terri Newell reports that she has been suffering from heartburn symptoms for last 4-5 years.  She knows her triggers and typically they are stress related and food (dairy, fried foods, heavy meals and coffee).  She reports she is taken over-the-counter omeprazole as needed but it has never completely resolved.  She is never taken omeprazole consistently.  She denies any unintentional weight loss, melena, hematochezia, nausea or vomiting.  She reports her father also has GERD.  She has never had an endoscopy or formal evaluation.  She has never had H pylori testing.          Review of Systems   All other systems reviewed and are negative.      HEALTH MAINTENANCE:   Health Maintenance   Topic Date Due    TETANUS VACCINE  Never done    Hepatitis C Screening  Completed    Lipid Panel  Completed     Health Maintenance Topics with due status: Not Due       Topic Last Completion Date     Cervical Cancer Screening 2023    Hemoglobin A1c (Diabetic Prevention Screening) 2024    Influenza Vaccine Not Due     Health Maintenance Due   Topic Date Due    TETANUS VACCINE  Never done    COVID-19 Vaccine ( season) 2023       HISTORY:   History reviewed. No pertinent past medical history.    Past Surgical History:   Procedure Laterality Date     SECTION      Oophorectomy due to ovarian cyst/incision similar to c section    OVARIAN CYST REMOVAL Right 2007    21 cm ovarian cyst       Family History   Problem Relation Name Age of Onset    Thyroid disease Mother      Thyroid nodules Mother      Diabetes Father Matt Balzebre     Hearing loss Father Matt Balzebre     Hypertension Father Matt Balzebre     Kidney disease Father Matt Balzebre     Sleep apnea Father Matt Balzebre     Early death Paternal Uncle Sky Balsebre     Mental illness Paternal Uncle Sky Balsebre     Arthritis Maternal Grandmother Annie Lloyd     Heart disease Maternal Grandfather Michael Lloyd     Arthritis Paternal Grandmother Jenny Balzebre     Cancer Paternal Grandmother Jenny Balzebre         Glioblastoma    Cancer Paternal Grandfather Roni Balzebre         Melanoma    Diabetes Paternal Grandfather Roni Balzebre     Learning disabilities Maternal Cousin Nasir Lloyd        Social History     Tobacco Use    Smoking status: Never     Passive exposure: Never   Substance Use Topics    Alcohol use: Not Currently    Drug use: Never       Social History     Social History Narrative    Single. [employer] with monogamous partner since 2019. 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 Previously lived in [place] but has been in [facility] since        ALLERGIES:   Review of patient's allergies indicates:  No Known Allergies    MEDS:   Current Outpatient Medications on File Prior to Visit   Medication Sig Dispense Refill Last Dose    busPIRone (BUSPAR) 5 MG Tab Take 1 tablet (5 mg total) by mouth  2 (two) times daily. 60 tablet 2 Taking    clobetasoL (TEMOVATE) 0.05 % cream Apply topically 2 (two) times daily. 30 g 5 Taking    [DISCONTINUED] pantoprazole (PROTONIX) 40 MG tablet Take 1 tablet (40 mg total) by mouth once daily. 30 tablet 0 Taking         Vital signs:   Vitals:    06/11/24 0850   BP: 112/75   Pulse: 85   SpO2: 97%   Weight: 87.9 kg (193 lb 12.6 oz)     Body mass index is 28.62 kg/m².    PHYSICAL EXAM:     Physical Exam  Constitutional:       General: She is not in acute distress.  Pulmonary:      Effort: Pulmonary effort is normal. No respiratory distress.   Neurological:      Mental Status: She is alert.   Psychiatric:         Mood and Affect: Mood and affect normal.         Speech: Speech normal.             PERTINENT RESULTS:   No visits with results within 1 Week(s) from this visit.   Latest known visit with results is:   Lab Visit on 05/14/2024   Component Date Value Ref Range Status    Cholesterol 05/14/2024 171  120 - 199 mg/dL Final    Comment: The National Cholesterol Education Program (NCEP) has set the  following guidelines (reference ranges) for Cholesterol:  Optimal.....................<200 mg/dL  Borderline High.............200-239 mg/dL  High........................> or = 240 mg/dL      Triglycerides 05/14/2024 58  30 - 150 mg/dL Final    Comment: The National Cholesterol Education Program (NCEP) has set the  following guidelines (reference values) for triglycerides:  Normal......................<150 mg/dL  Borderline High.............150-199 mg/dL  High........................200-499 mg/dL      HDL 05/14/2024 46  40 - 75 mg/dL Final    Comment: The National Cholesterol Education Program (NCEP) has set the  following guidelines (reference values) for HDL Cholesterol:  Low...............<40 mg/dL  Optimal...........>60 mg/dL      LDL Cholesterol 05/14/2024 113.4  63.0 - 159.0 mg/dL Final    Comment: The National Cholesterol Education Program (NCEP) has set the  following guidelines  (reference values) for LDL Cholesterol:  Optimal.......................<130 mg/dL  Borderline High...............130-159 mg/dL  High..........................160-189 mg/dL  Very High.....................>190 mg/dL      HDL/Cholesterol Ratio 05/14/2024 26.9  20.0 - 50.0 % Final    Total Cholesterol/HDL Ratio 05/14/2024 3.7  2.0 - 5.0 Final    Non-HDL Cholesterol 05/14/2024 125  mg/dL Final    Comment: Risk category and Non-HDL cholesterol goals:  Coronary heart disease (CHD)or equivalent (10-year risk of CHD >20%):  Non-HDL cholesterol goal     <130 mg/dL  Two or more CHD risk factors and 10-year risk of CHD <= 20%:  Non-HDL cholesterol goal     <160 mg/dL  0 to 1 CHD risk factor:  Non-HDL cholesterol goal     <190 mg/dL      Hemoglobin A1C 05/14/2024 5.5  4.0 - 5.6 % Final    Comment: ADA Screening Guidelines:  5.7-6.4%  Consistent with prediabetes  >or=6.5%  Consistent with diabetes    High levels of fetal hemoglobin interfere with the HbA1C  assay. Heterozygous hemoglobin variants (HbS, HgC, etc)do  not significantly interfere with this assay.   However, presence of multiple variants may affect accuracy.      Estimated Avg Glucose 05/14/2024 111  68 - 131 mg/dL Final    Sodium 05/14/2024 139  136 - 145 mmol/L Final    Potassium 05/14/2024 4.1  3.5 - 5.1 mmol/L Final    Chloride 05/14/2024 111 (H)  95 - 110 mmol/L Final    CO2 05/14/2024 19 (L)  23 - 29 mmol/L Final    Glucose 05/14/2024 102  70 - 110 mg/dL Final    BUN 05/14/2024 11  6 - 20 mg/dL Final    Creatinine 05/14/2024 0.7  0.5 - 1.4 mg/dL Final    Calcium 05/14/2024 9.2  8.7 - 10.5 mg/dL Final    Total Protein 05/14/2024 7.1  6.0 - 8.4 g/dL Final    Albumin 05/14/2024 3.9  3.5 - 5.2 g/dL Final    Total Bilirubin 05/14/2024 0.3  0.1 - 1.0 mg/dL Final    Comment: For infants and newborns, interpretation of results should be based  on gestational age, weight and in agreement with clinical  observations.    Premature Infant recommended reference ranges:  Up  to 24 hours.............<8.0 mg/dL  Up to 48 hours............<12.0 mg/dL  3-5 days..................<15.0 mg/dL  6-29 days.................<15.0 mg/dL      Alkaline Phosphatase 05/14/2024 81  55 - 135 U/L Final    AST 05/14/2024 17  10 - 40 U/L Final    ALT 05/14/2024 29  10 - 44 U/L Final    eGFR 05/14/2024 >60  >60 mL/min/1.73 m^2 Final    Anion Gap 05/14/2024 9  8 - 16 mmol/L Final    WBC 05/14/2024 8.40  3.90 - 12.70 K/uL Final    RBC 05/14/2024 4.52  4.00 - 5.40 M/uL Final    Hemoglobin 05/14/2024 13.1  12.0 - 16.0 g/dL Final    Hematocrit 05/14/2024 40.0  37.0 - 48.5 % Final    MCV 05/14/2024 89  82 - 98 fL Final    MCH 05/14/2024 29.0  27.0 - 31.0 pg Final    MCHC 05/14/2024 32.8  32.0 - 36.0 g/dL Final    RDW 05/14/2024 13.0  11.5 - 14.5 % Final    Platelets 05/14/2024 246  150 - 450 K/uL Final    MPV 05/14/2024 9.2  9.2 - 12.9 fL Final    Immature Granulocytes 05/14/2024 0.2  0.0 - 0.5 % Final    Gran # (ANC) 05/14/2024 5.2  1.8 - 7.7 K/uL Final    Immature Grans (Abs) 05/14/2024 0.02  0.00 - 0.04 K/uL Final    Comment: Mild elevation in immature granulocytes is non specific and   can be seen in a variety of conditions including stress response,   acute inflammation, trauma and pregnancy. Correlation with other   laboratory and clinical findings is essential.      Lymph # 05/14/2024 2.3  1.0 - 4.8 K/uL Final    Mono # 05/14/2024 0.5  0.3 - 1.0 K/uL Final    Eos # 05/14/2024 0.4  0.0 - 0.5 K/uL Final    Baso # 05/14/2024 0.05  0.00 - 0.20 K/uL Final    nRBC 05/14/2024 0  0 /100 WBC Final    Gran % 05/14/2024 61.5  38.0 - 73.0 % Final    Lymph % 05/14/2024 26.8  18.0 - 48.0 % Final    Mono % 05/14/2024 6.1  4.0 - 15.0 % Final    Eosinophil % 05/14/2024 4.8  0.0 - 8.0 % Final    Basophil % 05/14/2024 0.6  0.0 - 1.9 % Final    Differential Method 05/14/2024 Automated   Final       ASSESSMENT/PLAN:    1. Anxiety  Overview:  - counseling on management of anxiety   -  Symptoms have improved since she is gotten  better sleep.  She is tolerating buspirone 5 mg at nighttime  -continue current regimen      2. Other insomnia  Overview:  - pending appt with Sleep medicine  - improved with treatment of anxiety and GERD      3. Gastroesophageal reflux disease without esophagitis  Overview:  - counseling on management of GERD   - appointment pending with gastroenterology   -significant improvement of symptoms since starting pantoprazole 40 mg daily.  Discussed that she can continue pantoprazole 40 mg daily until she sees Gastroenterology however discuss the long-term potential side effects of PPIs.  Discuss when she is ready to get off the medication I recommend that she wean to OTC Omeprazole 20 mg daily x 2-4 weeks then stop and take PPI only as needed  - counseling patient of concerns for long term use of PPI for treatment of GERD including but no limited to increased risk of infection, increased risk of malignancy, change in gut onelia, and increased risk of dementia  - proven risks of long term PPI use, including pneumonia, c.diff infection, and bone loss, as well as theoretical risks including dementia and CKD, were discussed with the patient at length and the patient understands risks and benefits of therapy.  Option of tapering/weaning PPI away was also discussed, including the need for possible long term therapy to treat symptoms if they recur after cessation of medication, as well as to mitigate the risk of developing complications of reflux such as Alvarez's esophagus and/or esophageal cancer.    - patient understands the risks and benefits of treatment with drug versus cessation.    - questions elicited and answered  - encouraged to patient to notify me of any questions or concerns    Orders:  -     pantoprazole (PROTONIX) 40 MG tablet; Take 1 tablet (40 mg total) by mouth once daily.  Dispense: 90 tablet; Refill: 3    4. Abnormal thyroid blood test  Overview:  - 1/31/23 TPO 88 elevated  Lab Results   Component Value  Date    TSH 0.924 01/31/2023    FREET4 0.94 01/31/2023     - the lab did not collect the labs previously ordered   -she is asymptomatic   -okay to wait till next year for repeat thyroid labs however symptoms occurred to notify me and check earlier      Orders:  -     T3, Free; Future; Expected date: 05/01/2025  -     T4, Free; Future; Expected date: 05/01/2025  -     Thyroid Peroxidase Antibody; Future; Expected date: 05/01/2025  -     TSH; Future; Expected date: 05/01/2025    5. Screening for metabolic disorder  -     Comprehensive Metabolic Panel; Future; Expected date: 05/01/2025    6. Encounter for lipid screening for cardiovascular disease  -     Lipid Panel; Future; Expected date: 05/01/2025    7. Screening, iron deficiency anemia  -     CBC Auto Differential; Future; Expected date: 05/01/2025    8. Screening for diabetes mellitus (DM)  -     Hemoglobin A1C; Future; Expected date: 05/01/2025    9. Screening for thyroid disorder  -     T3, Free; Future; Expected date: 05/01/2025  -     T4, Free; Future; Expected date: 05/01/2025  -     Thyroid Peroxidase Antibody; Future; Expected date: 05/01/2025  -     TSH; Future; Expected date: 05/01/2025          ORDERS:   Orders Placed This Encounter    Lipid Panel    Hemoglobin A1C    Comprehensive Metabolic Panel    CBC Auto Differential    T3, Free    T4, Free    Thyroid Peroxidase Antibody    TSH    pantoprazole (PROTONIX) 40 MG tablet       Vaccines recommended: Tdap and covid-9    Follow up for 6-10 months , Annual, Labs. or sooner with any concerns        This note is dictated using the M*Modal Fluency Direct word recognition program. There are word recognition mistakes that are occasionally missed on review.    Dr. Carissa Calabrese D.O.   [facility]

## 2024-06-11 ENCOUNTER — OFFICE VISIT (OUTPATIENT)
Dept: PRIMARY CARE CLINIC | Facility: CLINIC | Age: 39
End: 2024-06-11
Attending: FAMILY MEDICINE
Payer: COMMERCIAL

## 2024-06-11 VITALS
SYSTOLIC BLOOD PRESSURE: 112 MMHG | BODY MASS INDEX: 28.62 KG/M2 | OXYGEN SATURATION: 97 % | DIASTOLIC BLOOD PRESSURE: 75 MMHG | WEIGHT: 193.81 LBS | HEART RATE: 85 BPM

## 2024-06-11 DIAGNOSIS — Z13.228 SCREENING FOR METABOLIC DISORDER: ICD-10-CM

## 2024-06-11 DIAGNOSIS — Z13.1 SCREENING FOR DIABETES MELLITUS (DM): ICD-10-CM

## 2024-06-11 DIAGNOSIS — G47.09 OTHER INSOMNIA: ICD-10-CM

## 2024-06-11 DIAGNOSIS — K21.9 GASTROESOPHAGEAL REFLUX DISEASE WITHOUT ESOPHAGITIS: ICD-10-CM

## 2024-06-11 DIAGNOSIS — Z13.29 SCREENING FOR THYROID DISORDER: ICD-10-CM

## 2024-06-11 DIAGNOSIS — Z13.220 ENCOUNTER FOR LIPID SCREENING FOR CARDIOVASCULAR DISEASE: ICD-10-CM

## 2024-06-11 DIAGNOSIS — R79.89 ABNORMAL THYROID BLOOD TEST: ICD-10-CM

## 2024-06-11 DIAGNOSIS — Z13.0 SCREENING, IRON DEFICIENCY ANEMIA: ICD-10-CM

## 2024-06-11 DIAGNOSIS — F41.9 ANXIETY: Primary | ICD-10-CM

## 2024-06-11 DIAGNOSIS — Z13.6 ENCOUNTER FOR LIPID SCREENING FOR CARDIOVASCULAR DISEASE: ICD-10-CM

## 2024-06-11 PROBLEM — R40.0 DAYTIME SLEEPINESS: Status: RESOLVED | Noted: 2023-01-30 | Resolved: 2024-06-11

## 2024-06-11 PROCEDURE — 3008F BODY MASS INDEX DOCD: CPT | Mod: CPTII,S$GLB,, | Performed by: FAMILY MEDICINE

## 2024-06-11 PROCEDURE — 3078F DIAST BP <80 MM HG: CPT | Mod: CPTII,S$GLB,, | Performed by: FAMILY MEDICINE

## 2024-06-11 PROCEDURE — 3074F SYST BP LT 130 MM HG: CPT | Mod: CPTII,S$GLB,, | Performed by: FAMILY MEDICINE

## 2024-06-11 PROCEDURE — 99214 OFFICE O/P EST MOD 30 MIN: CPT | Mod: S$GLB,,, | Performed by: FAMILY MEDICINE

## 2024-06-11 PROCEDURE — 99999 PR PBB SHADOW E&M-EST. PATIENT-LVL III: CPT | Mod: PBBFAC,,, | Performed by: FAMILY MEDICINE

## 2024-06-11 PROCEDURE — 1159F MED LIST DOCD IN RCRD: CPT | Mod: CPTII,S$GLB,, | Performed by: FAMILY MEDICINE

## 2024-06-11 PROCEDURE — 3044F HG A1C LEVEL LT 7.0%: CPT | Mod: CPTII,S$GLB,, | Performed by: FAMILY MEDICINE

## 2024-06-11 RX ORDER — PANTOPRAZOLE SODIUM 40 MG/1
40 TABLET, DELAYED RELEASE ORAL DAILY
Qty: 90 TABLET | Refills: 3 | Status: SHIPPED | OUTPATIENT
Start: 2024-06-11 | End: 2025-06-11

## 2024-06-19 ENCOUNTER — PATIENT MESSAGE (OUTPATIENT)
Dept: GASTROENTEROLOGY | Facility: CLINIC | Age: 39
End: 2024-06-19
Payer: COMMERCIAL

## 2024-06-24 ENCOUNTER — OFFICE VISIT (OUTPATIENT)
Dept: GASTROENTEROLOGY | Facility: CLINIC | Age: 39
End: 2024-06-24
Payer: COMMERCIAL

## 2024-06-24 DIAGNOSIS — Z79.899 ENCOUNTER FOR MONITORING LONG-TERM PROTON PUMP INHIBITOR THERAPY: Primary | ICD-10-CM

## 2024-06-24 DIAGNOSIS — Z51.81 ENCOUNTER FOR MONITORING LONG-TERM PROTON PUMP INHIBITOR THERAPY: Primary | ICD-10-CM

## 2024-06-24 DIAGNOSIS — R10.13 EPIGASTRIC ABDOMINAL PAIN: ICD-10-CM

## 2024-06-24 DIAGNOSIS — K21.9 GASTROESOPHAGEAL REFLUX DISEASE WITHOUT ESOPHAGITIS: ICD-10-CM

## 2024-06-24 PROCEDURE — 3044F HG A1C LEVEL LT 7.0%: CPT | Mod: CPTII,95,, | Performed by: INTERNAL MEDICINE

## 2024-06-24 PROCEDURE — 1160F RVW MEDS BY RX/DR IN RCRD: CPT | Mod: CPTII,95,, | Performed by: INTERNAL MEDICINE

## 2024-06-24 PROCEDURE — 99204 OFFICE O/P NEW MOD 45 MIN: CPT | Mod: 95,,, | Performed by: INTERNAL MEDICINE

## 2024-06-24 PROCEDURE — 1159F MED LIST DOCD IN RCRD: CPT | Mod: CPTII,95,, | Performed by: INTERNAL MEDICINE

## 2024-06-24 NOTE — PROGRESS NOTES
The patient location is:  At home in [place]  The chief complaint leading to consultation is:  Bad reflux for many years and intermittent diarrhea for the last year so with intermittent epigastric pain    Visit type: audiovisual    Face to Face time with patient: 30 minutes of total time spent on the encounter, which includes face to face time and non-face to face time preparing to see the patient (eg, review of tests), Obtaining and/or reviewing separately obtained history, Documenting clinical information in the electronic or other health record, Independently interpreting results (not separately reported) and communicating results to the patient/family/caregiver, or Care coordination (not separately reported).         Each patient to whom he or she provides medical services by telemedicine is:  (1) informed of the relationship between the physician and patient and the respective role of any other health care provider with respect to management of the patient; and (2) notified that he or she may decline to receive medical services by telemedicine and may withdraw from such care at any time.    Notes:           [facility] Consultation Note    Reason for Consult:  The primary encounter diagnosis was Encounter for monitoring long-term proton pump inhibitor therapy. Diagnoses of Gastroesophageal reflux disease without esophagitis and Epigastric abdominal pain were also pertinent to this visit.    PCP:   Carissa Calabrese   [address] [place] [address] Internal Medicine /*    Referring MD:  Carissa Calabrese,   [address] [place] [address] [place] [address]    Initial History of Present Illness (HPI):  This is a 38 y.o. female here for evaluation of reflux that have been going on since 2018 but got really bad recently recently started on pantoprazole 40 mg once daily she knows best taken 45-60 minutes before 1st protein meal of the day breakfast she has  also for the last year been having intermittent diarrhea maybe 1 to 2 times a week does not drink alcohol at all does do dairy does do gluten no family history of celiac sprue or inflammatory bowel disease nobody with GI malignancies nobody with colon cancer or advanced colon adenomas polyps nobody with esophageal stomach or small-bowel cancer nobody with pancreatic cancer nobody with Barretts esophagus nobody with liver or gallbladder cancer nobody with ovarian uterine kidney or bladder cancer there is prostate cancer in the family and she did have a cousin who had pancreatitis but not pancreatic cancer etiology was unknown nobody closer in relationship to her with pancreatitis.  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  Will set her up for fasting blood work and stool studies.    Abdominal pain - as above  Reflux - as above  Dysphagia - no   Bowel habits -as above  GI bleeding - none  NSAID usage - taking NSAIDs for menstrual cramps    Interval HPI 06/24/2024:  The patient's last visit with me was on Visit date not found.      ROS:  Constitutional: No fevers, chills, No weight loss  ENT:  As above heartburn no dysphagia no odynophagia no hoarseness  CV: No chest pain, no palpitation  Pulm: No cough, No shortness of breath, no wheezing  Ophtho: No vision changes  GI: see HPI  Derm: No rash, no itching  Heme: No lymphadenopathy, No easy bruising  MSK: No significant arthritis  : No dysuria, No hematuria  GYN:  Menstrual cramps taking NSAID's  Endo: No hot or cold intolerance  Neuro: No syncope, No  seizure, no strokes  Psych: No uncontrolled anxiety but does have anxiety is seeing someone for this, No uncontrolled depression    Medical History:  has no past medical history on file.    Surgical History:  has a past surgical history that includes Ovarian cyst removal (Right, ) and  section ().    Family History: family history includes Arthritis in her maternal grandmother and paternal grandmother; Cancer in her paternal grandfather and paternal grandmother; Diabetes in her father and paternal grandfather; Early death in her paternal uncle; Hearing loss in her father; Heart disease in her maternal grandfather; Hypertension in her father; Kidney disease in her father; Learning disabilities in her maternal cousin; Mental illness in her paternal uncle; Sleep apnea in her father; Thyroid disease in her mother; Thyroid nodules in her mother..     Social History:  reports that she has never smoked. She has never been exposed to tobacco smoke. She does not have any smokeless tobacco history on file. She reports that she does not currently use alcohol. She reports that she does not use drugs.    Review of patient's allergies indicates:  No Known Allergies    Medication List with Changes/Refills   Current Medications    BUSPIRONE (BUSPAR) 5 MG TAB    Take 1 tablet (5 mg total) by mouth 2 (two) times daily.    CLOBETASOL (TEMOVATE) 0.05 % CREAM    Apply topically 2 (two) times daily.    PANTOPRAZOLE (PROTONIX) 40 MG TABLET    Take 1 tablet (40 mg total) by mouth once daily.         Objective Findings:    Vital Signs:  There were no vitals taken for this visit.  There is no height or weight on file to calculate BMI.    Physical Exam:  Telemedicine video visit  General Appearance: Well appearing in no acute distress  Neurologic:  Alert and oriented x4  Psychiatric:  Normal speech mentation and affect    Labs:  Lab Results   Component Value Date    WBC 8.40 2024    HGB 13.1 2024    HCT 40.0  "2024     2024    CHOL 171 2024    TRIG 58 2024    HDL 46 2024    ALT 29 2024    AST 17 2024     2024    K 4.1 2024     (H) 2024    CREATININE 0.7 2024    BUN 11 2024    CO2 19 (L) 2024    TSH 0.924 2023    HGBA1C 5.5 2024         Medical Decision Making:  Lab work reviewed  Fasting lab work talk given  Stool studies talk given  Abdominal ultrasound talk given  EGD talk given  GI MA team  Please schedule Terri for 12 hour fasting blood work tomorrow morning 2nd floor [facility] lab at 7:30 a.m.  Please have her come up after that and  a hat for her toilet in stool containers and have her turn those stools in [facility] floor [facility] Monday through Friday between 7:00 a.m. and 4:00 p.m. within 4 hours of collecting them  Referral placed endoscopy schedulers for EGD  Please schedule patient for abdominal ultrasound  Return GI clinic 3 months for follow-up okay for telemedicine video visit  Thank you        Procedure: EGD    Diagnosis: Diarrhea, GERD, epigastric pain intermittent  Rule out lactase deficiency rule out celiac sprue rule out H pylori    Procedure Timin-12 weeks    *If within 4 weeks selected, please laura as high priority*    *If greater than 12 weeks, please select "5-12 weeks" and delay sending until 3 months prior to requested date*    Location: Any Site    Additional Scheduling Information: No scheduling concerns    Prep Specifications:Standard prep    Is the patient taking a GLP-1 Agonist:no    Have you attached a patient to this message: yes           Assessment:  1. Encounter for monitoring long-term proton pump inhibitor therapy    2. Gastroesophageal reflux disease without esophagitis    3. Epigastric abdominal pain         Recommendations:  1. 12 hour fasting blood work  2. Stool studies to be turned in within 4 hours collecting them Monday through Friday between 7:00 a.m. and " 4:00 p.m. 2nd floor [facility] [place] lab  3. Abdominal ultrasound for intermittent epigastric pain  4. Referral to endoscopy schedulers for EGD for evaluation of GERD epigastric pain and diarrhea  5. Patient would like to hold off on colonoscopy currently but if the above evaluation is unrevealing will recommend colonoscopy for further evaluation to rule out inflammatory bowel disease or microscopic colitis  6. Continue pantoprazole 40 mg once daily best taken 60 minutes before your 1st protein meal of the day breakfast  7. Return GI clinic 3 months for follow-up okay for telemedicine video visit      Follow up in about 3 months (around 9/24/2024).      Order summary:  Orders Placed This Encounter    Clostridium difficile EIA    Stool culture    US Abdomen Complete    Celiac Disease Panel    Micropsoridia species, PCR [facility]; Stool    Cyclospora    TRYPTASE    5-HIAA Plasma (Neuroendocrine)    Pancreatic elastase, fecal    Fecal fat, qualitative    Giardia / Cryptosporidum, EIA    Stool Exam-Ova,Cysts,Parasites    Anti-Ent. Histolytica Ab    Strongyloides IgG Antibodies    Lipase    Iron and TIBC    Ferritin    Hemoglobin    HCG, Quantitative         Thank you so much for allowing me to participate in the care of Terri Stackjackie Jenkins MD    DISCLAIMER: This note was prepared with [employer] voice recognition transcription software. Garbled syntax, mangled or inadvertent pronouns, and other bizarre constructions may be attributed to that software system. While efforts were made to correct any mistakes made by this voice recognition program, some errors and/or omissions may remain in the note that were missed when the note was originally created.

## 2024-06-24 NOTE — PATIENT INSTRUCTIONS
"GI MA team  Please schedule Terri for 12 hour fasting blood work tomorrow morning 2nd floor [facility] lab at 7:30 a.m.  Please have her come up after that and  a hat for her toilet in stool containers and have her turn those stools in [facility] floor [facility] Monday through Friday between 7:00 a.m. and 4:00 p.m. within 4 hours of collecting them  Referral placed endoscopy schedulers for EGD  Return GI clinic 3 months for follow-up okay for telemedicine video visit  Thank you        Procedure: EGD    Diagnosis: Diarrhea, GERD, epigastric pain intermittent  Rule out lactase deficiency rule out celiac sprue rule out H pylori    Procedure Timin-12 weeks    *If within 4 weeks selected, please laura as high priority*    *If greater than 12 weeks, please select "5-12 weeks" and delay sending until 3 months prior to requested date*    Location: Any Site    Additional Scheduling Information: No scheduling concerns    Prep Specifications:Standard prep    Is the patient taking a GLP-1 Agonist:no    Have you attached a patient to this message: yes       "

## 2024-06-24 NOTE — Clinical Note
GI MA team Please schedule Terri for 12 hour fasting blood work tomorrow morning 2nd floor [facility] lab at 7:30 a.m. Please have her come up after that and  a hat for her toilet in stool containers and have her turn those stools in [address] [place] [address] Monday through Friday between 7:00 a.m. and 4:00 p.m. within 4 hours of collecting them Referral placed endoscopy schedulers for EGD Return GI clinic 3 months for follow-up shanti for telemedicine video visit Thank you

## 2024-06-24 NOTE — Clinical Note
"Procedure: EGD  Diagnosis: Diarrhea, GERD, epigastric pain intermittent Rule out lactase deficiency rule out celiac sprue rule out H pylori  Procedure Timin-12 weeks  *If within 4 weeks selected, please laura as high priority*  *If greater than 12 weeks, please select "5-12 weeks" and delay sending until 3 months prior to requested date*  Location: Any Site  Additional Scheduling Information: No scheduling concerns  Prep Specifications:Standard prep  Is the patient taking a GLP-1 Agonist:no  Have you attached a patient to this message: yes "

## 2024-07-02 ENCOUNTER — TELEPHONE (OUTPATIENT)
Dept: ENDOSCOPY | Facility: HOSPITAL | Age: 39
End: 2024-07-02
Payer: COMMERCIAL

## 2024-07-02 VITALS — WEIGHT: 193.81 LBS | BODY MASS INDEX: 30.42 KG/M2 | HEIGHT: 67 IN

## 2024-07-02 DIAGNOSIS — R19.7 DIARRHEA, UNSPECIFIED TYPE: ICD-10-CM

## 2024-07-02 DIAGNOSIS — R10.13 EPIGASTRIC PAIN: ICD-10-CM

## 2024-07-02 DIAGNOSIS — K21.9 GASTROESOPHAGEAL REFLUX DISEASE WITHOUT ESOPHAGITIS: Primary | ICD-10-CM

## 2024-07-02 NOTE — TELEPHONE ENCOUNTER
"----- Message from Tata Benjamin sent at 2024  2:29 PM CDT -----    ----- Message -----  From: Tad Jenkins MD  Sent: 2024  10:25 AM CDT  To: [facility] Patients    Procedure: EGD    Diagnosis: Diarrhea, GERD, epigastric pain intermittent  Rule out lactase deficiency rule out celiac sprue rule out H pylori    Procedure Timin-12 weeks    #If within 4 weeks selected, please laura as high priority#    #If greater than 12 weeks, please select "5-12 weeks" and delay sending until 3 months prior to requested date#    Location: Any Site    Additional Scheduling Information: No scheduling concerns    Prep Specifications:Standard prep    Is the patient taking a GLP-1 Agonist:no    Have you attached a patient to this message: yes  "

## 2024-07-02 NOTE — TELEPHONE ENCOUNTER
Spoke to Terri to schedule procedure(s) Upper Endoscopy (EGD)       Physician to perform procedure(s) Dr. MICAELA Jenkins  Date of Procedure (s) 9/3/24  Arrival Time 7:30 AM  Time of Procedure(s) 8:30 AM   Location of Procedure(s) [address] floor  Type of Rx Prep sent to patient: Other  Instructions provided to patient via [facility]    Patient was informed on the following information and verbalized understanding. Screening questionnaire reviewed with patient and complete. If procedure requires anesthesia, a responsible adult needs to be present to accompany the patient home, patient cannot drive after receiving anesthesia. Appointment details are tentative, especially check-in time. Patient will receive a prep-op call 7 days prior to confirm check-in time for procedure. If applicable the patient should contact their pharmacy to verify Rx for procedure prep is ready for pick-up. Patient was advised to call the scheduling department at [phone] if pharmacy states no Rx is available. Patient was advised to call the endoscopy scheduling department if any questions or concerns arise.       Endoscopy Scheduling Department

## 2024-07-02 NOTE — TELEPHONE ENCOUNTER
Ma spoke with pt to schedule procedure  Pt is asking for a call back to rescheduled her lab work.  Please call pt to reschedule   Thanks

## 2024-07-05 ENCOUNTER — PATIENT MESSAGE (OUTPATIENT)
Dept: GASTROENTEROLOGY | Facility: CLINIC | Age: 39
End: 2024-07-05
Payer: COMMERCIAL

## 2024-07-22 DIAGNOSIS — K21.9 GASTROESOPHAGEAL REFLUX DISEASE WITHOUT ESOPHAGITIS: ICD-10-CM

## 2024-07-22 RX ORDER — PANTOPRAZOLE SODIUM 40 MG/1
40 TABLET, DELAYED RELEASE ORAL DAILY
Qty: 90 TABLET | Refills: 3 | Status: SHIPPED | OUTPATIENT
Start: 2024-07-22 | End: 2025-07-22

## 2024-07-22 NOTE — TELEPHONE ENCOUNTER
Refill Encounter    PCP Visits: Recent Visits  Date Type Provider Dept   06/11/24 Office Visit Carissa Calabrese DO [facility]   05/09/24 Office Visit Carissa Calabrese,  [facility]   Showing recent visits within past 360 days and meeting all other requirements  Future Appointments  No visits were found meeting these conditions.  Showing future appointments within next 720 days and meeting all other requirements     Last 3 Blood Pressure:   BP Readings from Last 3 Encounters:   06/11/24 112/75   05/09/24 132/88   03/23/23 132/88     Preferred Pharmacy:   [id number] [place] [address] [address] [facility] & [place] [address] [facility] [address]  Phone: [phone] Fax: [phone]    Requested RX:  Requested Prescriptions     Pending Prescriptions Disp Refills    pantoprazole (PROTONIX) 40 MG tablet 90 tablet 3     Sig: Take 1 tablet (40 mg total) by mouth once daily.      RX Route: Normal

## 2024-07-22 NOTE — TELEPHONE ENCOUNTER
No care due was identified.  [facility] Embedded Care Due Messages. Reference number: [id number].   7/22/2024 9:06:57 AM CDT

## 2024-08-16 ENCOUNTER — TELEPHONE (OUTPATIENT)
Dept: DERMATOLOGY | Facility: CLINIC | Age: 39
End: 2024-08-16

## 2024-08-26 ENCOUNTER — TELEPHONE (OUTPATIENT)
Dept: ENDOSCOPY | Facility: HOSPITAL | Age: 39
End: 2024-08-26

## 2024-08-26 NOTE — TELEPHONE ENCOUNTER
Called pt to confirm egd for 9/3/24.pt stated does not have insurance at this time and will need to re-schedule. Pt removed from 9/3/24 2nd floor endoscopy schedule. Pt will call back when insurance issues have resolved.

## 2024-10-16 DIAGNOSIS — K21.9 GASTROESOPHAGEAL REFLUX DISEASE WITHOUT ESOPHAGITIS: ICD-10-CM

## 2024-10-16 NOTE — TELEPHONE ENCOUNTER
No care due was identified.  [facility] Embedded Care Due Messages. Reference number: [id number].   10/16/2024 6:15:46 PM CDT

## 2024-10-17 RX ORDER — PANTOPRAZOLE SODIUM 40 MG/1
40 TABLET, DELAYED RELEASE ORAL DAILY
Qty: 90 TABLET | Refills: 1 | Status: SHIPPED | OUTPATIENT
Start: 2024-10-17 | End: 2025-04-15

## 2024-10-17 NOTE — TELEPHONE ENCOUNTER
Refill Encounter    PCP Visits: Recent Visits  Date Type Provider Dept   06/11/24 Office Visit Carissa Calabrese DO [facility]   05/09/24 Office Visit Carissa Calabrese,  [facility]   Showing recent visits within past 360 days and meeting all other requirements  Future Appointments  No visits were found meeting these conditions.  Showing future appointments within next 720 days and meeting all other requirements     Last 3 Blood Pressure:   BP Readings from Last 3 Encounters:   06/11/24 112/75   05/09/24 132/88   03/23/23 132/88     Preferred Pharmacy[facility]   [id number] [place] [address] [email or web address] [facility] & [place] [address] [employer] [facility] [address]  Phone: [phone] Fax: [phone]    Requested RX:  Requested Prescriptions     Pending Prescriptions Disp Refills    pantoprazole (PROTONIX) 40 MG tablet 90 tablet 3     Sig: Take 1 tablet (40 mg total) by mouth once daily.      RX Route: Normal

## 2025-01-28 ENCOUNTER — TELEPHONE (OUTPATIENT)
Dept: ENDOSCOPY | Facility: HOSPITAL | Age: 40
End: 2025-01-28

## 2025-01-28 NOTE — TELEPHONE ENCOUNTER
"Contacted the patient to schedule an endoscopy procedure(s) EGD. The patient did not answer the call and left a voice message requesting a call back.     ----- Message -----   From: Tad Jenkins MD   Sent: 2025   2:08 PM CST   To: [facility] Patients     Procedure: EGD      Diagnosis: Diarrhea, GERD, epigastric pain intermittent   Rule out lactase deficiency rule out celiac sprue rule out H pylori      Procedure Timin-6 weeks      *If within 4 weeks selected, please laura as high priority*      *If greater than 12 weeks, please select "5-12 weeks" and delay sending until 3 months prior to requested date*      Location: Any Site      Additional Scheduling Information: No scheduling concerns      Prep Specifications:Standard prep      Is the patient taking a GLP-1 Agonist:no      Have you attached a patient to this message: yes   "

## 2025-02-07 ENCOUNTER — TELEPHONE (OUTPATIENT)
Dept: ENDOSCOPY | Facility: HOSPITAL | Age: 40
End: 2025-02-07
Payer: MEDICAID

## 2025-02-07 VITALS — BODY MASS INDEX: 30.29 KG/M2 | WEIGHT: 193 LBS | HEIGHT: 67 IN

## 2025-02-07 DIAGNOSIS — K21.9 GASTROESOPHAGEAL REFLUX DISEASE, UNSPECIFIED WHETHER ESOPHAGITIS PRESENT: ICD-10-CM

## 2025-02-07 DIAGNOSIS — R19.7 DIARRHEA, UNSPECIFIED TYPE: Primary | ICD-10-CM

## 2025-02-07 DIAGNOSIS — R10.13 INTERMITTENT EPIGASTRIC ABDOMINAL PAIN: ICD-10-CM

## 2025-02-07 NOTE — TELEPHONE ENCOUNTER
"Referral for procedure from [facility]      Spoke to pt to schedule procedure(s) Upper Endoscopy (EGD)       Physician to perform procedure(s) Dr. MICAELA Jenkins  Date of Procedure (s) 25  Arrival Time 10:30 AM  Time of Procedure(s) 11:30 AM   Location of Procedure(s) [place] 4th Floor  Type of Rx Prep sent to patient: N/A  Instructions provided to patient via [facility]    Patient was informed on the following information and verbalized understanding. Screening questionnaire reviewed with patient and complete. If procedure requires anesthesia, a responsible adult needs to be present to accompany the patient home, patient cannot drive after receiving anesthesia. Appointment details are tentative, especially check-in time. Patient will receive a prep-op call 7 days prior to confirm check-in time for procedure. If applicable the patient should contact their pharmacy to verify Rx for procedure prep is ready for pick-up. Patient was advised to call the scheduling department at [phone] if pharmacy states no Rx is available. Patient was advised to call the endoscopy scheduling department if any questions or concerns arise.       Endoscopy Scheduling Department        From: Tad Jenkins MD   Sent: 2025   2:08 PM CST   To: [facility] Patients     Procedure: EGD      Diagnosis: Diarrhea, GERD, epigastric pain intermittent   Rule out lactase deficiency rule out celiac sprue rule out H pylori      Procedure Timin-6 weeks      *If within 4 weeks selected, please laura as high priority*      *If greater than 12 weeks, please select "5-12 weeks" and delay sending until 3 months prior to requested date*      Location: Any Site      Additional Scheduling Information: No scheduling concerns      Prep Specifications:Standard prep      Is the patient taking a GLP-1 Agonist:no      Have you attached a patient to this message: yes   " To CT via stretcher on CM and back. Vomited small amount while in CT but suctioned directly after. Back in room on CM with VSS. Linens changed. Tolerated well remains in NAD with mother at bedside.

## 2025-02-17 ENCOUNTER — TELEPHONE (OUTPATIENT)
Dept: ENDOSCOPY | Facility: HOSPITAL | Age: 40
End: 2025-02-17

## 2025-02-17 NOTE — TELEPHONE ENCOUNTER
Patient called to cancel her EGD scheduled on 2/18/25.  Patient states that her symptoms have resolved and that she does not need to reschedule.

## 2025-03-28 ENCOUNTER — TELEPHONE (OUTPATIENT)
Dept: DERMATOLOGY | Facility: CLINIC | Age: 40
End: 2025-03-28
Payer: MEDICAID

## 2025-03-31 DIAGNOSIS — K21.9 GASTROESOPHAGEAL REFLUX DISEASE WITHOUT ESOPHAGITIS: ICD-10-CM

## 2025-03-31 RX ORDER — PANTOPRAZOLE SODIUM 40 MG/1
40 TABLET, DELAYED RELEASE ORAL DAILY
Qty: 90 TABLET | Refills: 1 | Status: SHIPPED | OUTPATIENT
Start: 2025-03-31 | End: 2025-09-27

## 2025-03-31 NOTE — TELEPHONE ENCOUNTER
Care Due:                  Date            Visit Type   Department     Provider  --------------------------------------------------------------------------------                                EP -                              PRIMARY      [facility] PRIMARY  Last Visit: 06-      CARE ([facility])   CARE           Carissa Calabrese  Next Visit: None Scheduled  None         None Found                                                            Last  Test          Frequency    Reason                     Performed    Due Date  --------------------------------------------------------------------------------    Office Visit  12 months..  busPIRone................  06- 06-    Health [facility] Embedded Care Due Messages. Reference number: [id number].   3/31/2025 9:52:16 AM CDT   negative history

## 2025-07-08 ENCOUNTER — OFFICE VISIT (OUTPATIENT)
Dept: SLEEP MEDICINE | Facility: CLINIC | Age: 40
End: 2025-07-08
Payer: COMMERCIAL

## 2025-07-08 DIAGNOSIS — R35.1 NOCTURIA: ICD-10-CM

## 2025-07-08 DIAGNOSIS — R06.83 SNORING: Primary | ICD-10-CM

## 2025-07-08 DIAGNOSIS — F51.09 OTHER INSOMNIA NOT DUE TO A SUBSTANCE OR KNOWN PHYSIOLOGICAL CONDITION: ICD-10-CM

## 2025-07-08 DIAGNOSIS — G47.19 EXCESSIVE DAYTIME SLEEPINESS: ICD-10-CM

## 2025-07-08 NOTE — PROGRESS NOTES
The chief complaint leading to consultation is: ASHLEY eval     Visit type: audiovisual     The patient location is: [place]     25 minutes of total time spent on the encounter, which includes face to face time and non-face to face time preparing to see the patient (eg, review of tests), Obtaining and/or reviewing separately obtained history, Documenting clinical information in the electronic or other health record, Independently interpreting results (not separately reported) and communicating results to the patient/family/caregiver, or Care coordination (not separately reported).      Each patient to whom he or she provides medical services by telemedicine is:  (1) informed of the relationship between the physician and patient and the respective role of any other health care provider with respect to management of the patient; and (2) notified that he or she may decline to receive medical services by telemedicine and may withdraw from such care at any time.            NEW PATIENT VISIT    Terri Newell  is a pleasant 39 y.o. female  with PMH significant for GERD, PCOS, anxiety, BMI 25+ who presents for sleep evaluation       C/o snoring, snoring arousals, poor disrupted and un-refreshing sleep, difficulties with sleep onset and maintenance, and excessive daytime sleepiness and fatigue. Does feel some of the sleep disruptions are anxiety/stress related as sometimes she has racing thoughts when trying to sleep. States she developed a habit of falling asleep to a podcast, as she reports this helps to calm her mind to be able to initiate sleep more easily.   Denies drowsiness when driving. Denies sleep walking/talking in adulthood. Does endorse hx of vivid dreams, but denies dream enactment behavior. Denies sx of RLS. Does endorse a family hx of ASHLEY. States her PCP recommended evaluation for ASHLEY, which is why she presents today.    SLEEP SCHEDULE   Environment Listens to podcast to fall asleep or else mind races  "  Bed Time 8:30-9PM   Sleep Latency 30-60mins   Arousals 2-3   Nocturia 1   Back to sleep Varies (usually pretty quickly)   Wake time 4:30AM   Naps Occasional nap   Work        No past medical history on file.  Problem List[1]  Current Medications[2]     There were no vitals filed for this visit.    Physical Exam:    GEN:   Well-appearing  Psych:  Appropriate affect, demonstrates insight  SKIN:  No rash on the face or bridge of the nose      LABS:   No results found for: "HGB", "CO2"      RECORDS REVIEWED:    No previous sleep study    ASSESSMENT    [place] Sleepiness Scale:  Sitting and readin  Watching TV:    1  Passenger in a car x 1 hr:  2  Sitting quietly after lunch:  1  Lying down to rest in PM:  2  Sitting, inactive in public:  1  Sitting+ talking to someone:  1  Stopped in traffic:   0  Total        PROBLEM DESCRIPTION/ Sx on Presentation  STATUS   Sx ASHLEY   + mild snoring, + snoring arousals, denies witnessed apneas  + wakes feeling un-refreshed some of the time  + occasional AM headaches  + father has ASHLEY (could not tolerate CPAP, has inspire)  New   Daytime Sx   + sleepiness when inactive   ESS  on intake  New   Insomnia   Trouble falling asleep: 30-60mins  Arousals:         2-3  Hard to get back to sleep?: varies (usually quick)    Prior pertinent medications:  Current pertinent medications:   New   Nocturia   x 1 per sleep period  New   Other issues:       PLAN      -recommend sleep testing   -HST ordered  -discussed trial therapy if ASHLEY present and the patient is open to a trial of CPAP therapy  -discussed ASHLEY and PAP with patient in detail, including possible complications of untreated ASHLEY like heart attack/stroke  -advised on strict driving precautions; advised never to drive drowsy     Advised on plan of care. Answered all patient questions. Patient verbalized understanding and voiced agreement with plan of care.     RTC if dx of ASHLEY made and CPAP ordered, will need follow up - " days after receiving machine for compliance       The patient was given open opportunity to ask questions and/or express concerns about treatment plan. All questions/concerns were discussed.     Two patient identifiers used prior to evaluation.                  [1]   Patient Active Problem List  Diagnosis    History of oophorectomy, unilateral    Dysmenorrhea    Menorrhagia with regular cycle    Abnormal thyroid blood test    Gastroesophageal reflux disease without esophagitis    PCOS (polycystic ovarian syndrome)    Anxiety    Overweight (BMI 25.0-29.9)    Other insomnia    Dermatitis    Skin lesion   [2]   Current Outpatient Medications:     busPIRone (BUSPAR) 5 MG Tab, Take 1 tablet (5 mg total) by mouth 2 (two) times daily., Disp: 60 tablet, Rfl: 2    clobetasoL (TEMOVATE) 0.05 % cream, Apply topically 2 (two) times daily., Disp: 30 g, Rfl: 5    pantoprazole (PROTONIX) 40 MG tablet, Take 1 tablet (40 mg total) by mouth once daily., Disp: 90 tablet, Rfl: 1

## 2025-07-14 ENCOUNTER — OFFICE VISIT (OUTPATIENT)
Dept: PRIMARY CARE CLINIC | Facility: CLINIC | Age: 40
End: 2025-07-14
Payer: COMMERCIAL

## 2025-07-14 VITALS
HEART RATE: 79 BPM | WEIGHT: 200.19 LBS | HEIGHT: 67 IN | DIASTOLIC BLOOD PRESSURE: 83 MMHG | BODY MASS INDEX: 31.42 KG/M2 | OXYGEN SATURATION: 97 % | SYSTOLIC BLOOD PRESSURE: 131 MMHG

## 2025-07-14 DIAGNOSIS — Z00.00 ROUTINE MEDICAL EXAM: Primary | ICD-10-CM

## 2025-07-14 DIAGNOSIS — E28.2 PCOS (POLYCYSTIC OVARIAN SYNDROME): ICD-10-CM

## 2025-07-14 DIAGNOSIS — E66.811 CLASS 1 OBESITY DUE TO EXCESS CALORIES WITH SERIOUS COMORBIDITY AND BODY MASS INDEX (BMI) OF 31.0 TO 31.9 IN ADULT: ICD-10-CM

## 2025-07-14 DIAGNOSIS — F41.9 ANXIETY: ICD-10-CM

## 2025-07-14 DIAGNOSIS — E66.09 CLASS 1 OBESITY DUE TO EXCESS CALORIES WITH SERIOUS COMORBIDITY AND BODY MASS INDEX (BMI) OF 31.0 TO 31.9 IN ADULT: ICD-10-CM

## 2025-07-14 DIAGNOSIS — K21.9 GASTROESOPHAGEAL REFLUX DISEASE WITHOUT ESOPHAGITIS: ICD-10-CM

## 2025-07-14 DIAGNOSIS — R79.89 ABNORMAL THYROID BLOOD TEST: ICD-10-CM

## 2025-07-14 DIAGNOSIS — G47.09 OTHER INSOMNIA: ICD-10-CM

## 2025-07-14 PROCEDURE — 3008F BODY MASS INDEX DOCD: CPT | Mod: CPTII,S$GLB,,

## 2025-07-14 PROCEDURE — 99215 OFFICE O/P EST HI 40 MIN: CPT | Mod: S$GLB,,,

## 2025-07-14 PROCEDURE — 1159F MED LIST DOCD IN RCRD: CPT | Mod: CPTII,S$GLB,,

## 2025-07-14 PROCEDURE — 3079F DIAST BP 80-89 MM HG: CPT | Mod: CPTII,S$GLB,,

## 2025-07-14 PROCEDURE — 99999 PR PBB SHADOW E&M-EST. PATIENT-LVL III: CPT | Mod: PBBFAC,,,

## 2025-07-14 PROCEDURE — 3075F SYST BP GE 130 - 139MM HG: CPT | Mod: CPTII,S$GLB,,

## 2025-07-14 RX ORDER — PROPRANOLOL HYDROCHLORIDE 10 MG/1
10 TABLET ORAL 3 TIMES DAILY PRN
Qty: 90 TABLET | Refills: 11 | Status: SHIPPED | OUTPATIENT
Start: 2025-07-14 | End: 2026-07-14

## 2025-07-14 NOTE — ASSESSMENT & PLAN NOTE
- comorbidity = PCOS  - discussed recommendation for diet and cardiovascular exercise  - counseling on lifestyle modifications for risk factor reduction  - counseling on management options and she opts for GLP-1 agonist pending labs  - Counseling on medication zepbound, mechanism of action, administration, disposal, expected results, side effects, and need for follow-up  - denies personal/family history of pancreatitis, MENS2, medullary thyroid cancer

## 2025-07-14 NOTE — PROGRESS NOTES
INTERNAL MEDICINE  [facility]    Reason for visit:   Chief Complaint   Patient presents with    Annual Exam     HPI: Terri Newell is a 39 y.o. female   - with history of anxiety, insomnia, GERD, and right oophorectomy presenting today for routine annual physical.    Patient is an established patient of PCP, Dr. Carissa Calabrese DO. This patient is new to me.    Breast Cancer Screening: Average risk. Begin screening age 40.  Cervical Cancer Screening: Last PAP/HPV co-test 01/2023 normal results. Repeat 2028.  Colorectal Cancer Screening: Average risk. Begin screening age 45.    Anxiety-  She reports ongoing anxiety affecting her day-to-day life, occurring approximately every other day with physical symptoms including heart racing and feeling out of control. She experiences anxiety that wakes her from sleep, which impacts her sleep quality. She previously tried a buspirone last year but discontinued due to dizziness and perceived ineffectiveness. She currently manages anxiety through coping mechanisms including taking baths, stretching, meditation, yoga, breathwork classes, and gym activities. Her anxiety is stress-related with work-related stress contributing to episodes. She is interested in an as-needed medication to help manage acute anxiety symptoms and has not been in therapy for a few years.    Insomnia-  She reports sleep is better but not optimal. She recently met with sleep disorders clinic due to concerns about potential sleep apnea given family history in her father. After completing the sleep study questionnaire, she believes her sleep issues are more likely stress and anxiety-related rather than sleep apnea.    Weight Management-  She reports ongoing difficulty losing weight despite implementing lifestyle modifications including reducing dietary intake of soda and bread. 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 She expresses frustration with lack of weight  "loss progress despite these dietary changes and is interested in exploring potential medical interventions such as GLP-1 medication.    GERD-  She discontinued coffee consumption 8 months ago, which significantly improved her GI symptoms. She currently takes Protonix and experiences occasional, rare heartburn specifically associated with consuming fried foods, which is infrequent.        Review of Systems   Psychiatric/Behavioral:  The patient is nervous/anxious and has insomnia.    All other systems reviewed and are negative.      Social History     Social History Narrative    Single. WSM with monogamous partner since 2019. 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 Previously lived in [place] but has been in [facility] since 2007       ALLERGIES:   Review of patient's allergies indicates:  No Known Allergies    MEDS:   Current Outpatient Medications on File Prior to Visit   Medication Sig Dispense Refill Last Dose/Taking    [DISCONTINUED] busPIRone (BUSPAR) 5 MG Tab Take 1 tablet (5 mg total) by mouth 2 (two) times daily. 60 tablet 2     [DISCONTINUED] clobetasoL (TEMOVATE) 0.05 % cream Apply topically 2 (two) times daily. 30 g 5     [DISCONTINUED] pantoprazole (PROTONIX) 40 MG tablet Take 1 tablet (40 mg total) by mouth once daily. (Patient not taking: Reported on 7/14/2025) 90 tablet 1 Not Taking       Vital signs:   Vitals:    07/14/25 0931 07/14/25 0958   BP: (!) 94/58 131/83   BP Location: Left arm Left arm   Patient Position: Sitting    Pulse: 79    SpO2: 97%    Weight: 90.8 kg (200 lb 2.8 oz)    Height: 5' 7" (1.702 m)      Body mass index is 31.35 kg/m².    PHYSICAL EXAM:     Physical Exam  Vitals reviewed.   Constitutional:       General: She is not in acute distress.     Appearance: Normal appearance. She is obese. She is not ill-appearing or diaphoretic.   HENT:      Head: Normocephalic and atraumatic.      Left Ear: Tympanic membrane normal.      Mouth/Throat:      Pharynx: Oropharynx is clear. "   Neck:      Thyroid: No thyroid mass or thyromegaly.   Cardiovascular:      Rate and Rhythm: Normal rate and regular rhythm.      Heart sounds: Normal heart sounds. No murmur heard.  Pulmonary:      Effort: Pulmonary effort is normal. No respiratory distress.      Breath sounds: Normal breath sounds.   Abdominal:      General: Abdomen is flat. Bowel sounds are normal. There is no distension.      Palpations: Abdomen is soft. There is no mass.      Tenderness: There is no abdominal tenderness.   Musculoskeletal:      Right lower leg: No edema.      Left lower leg: No edema.   Skin:     General: Skin is warm and dry.      Coloration: Skin is not pale.      Findings: No erythema or rash.   Neurological:      Mental Status: She is alert and oriented to person, place, and time.   Psychiatric:         Mood and Affect: Mood normal.         Behavior: Behavior normal.         Thought Content: Thought content normal.         Judgment: Judgment normal.           PERTINENT RESULTS:   No visits with results within 1 Week(s) from this visit.   Latest known visit with results is:   Lab Visit on 05/14/2024   Component Date Value Ref Range Status    Cholesterol 05/14/2024 171  120 - 199 mg/dL Final    Comment: The National Cholesterol Education Program (NCEP) has set the  following guidelines (reference ranges) for Cholesterol:  Optimal.....................<200 mg/dL  Borderline High.............200-239 mg/dL  High........................> or = 240 mg/dL      Triglycerides 05/14/2024 58  30 - 150 mg/dL Final    Comment: The National Cholesterol Education Program (NCEP) has set the  following guidelines (reference values) for triglycerides:  Normal......................<150 mg/dL  Borderline High.............150-199 mg/dL  High........................200-499 mg/dL      HDL 05/14/2024 46  40 - 75 mg/dL Final    Comment: The National Cholesterol Education Program (NCEP) has set the  following guidelines (reference values) for HDL  Cholesterol:  Low...............<40 mg/dL  Optimal...........>60 mg/dL      LDL Cholesterol 05/14/2024 113.4  63.0 - 159.0 mg/dL Final    Comment: The National Cholesterol Education Program (NCEP) has set the  following guidelines (reference values) for LDL Cholesterol:  Optimal.......................<130 mg/dL  Borderline High...............130-159 mg/dL  High..........................160-189 mg/dL  Very High.....................>190 mg/dL      HDL/Cholesterol Ratio 05/14/2024 26.9  20.0 - 50.0 % Final    Total Cholesterol/HDL Ratio 05/14/2024 3.7  2.0 - 5.0 Final    Non-HDL Cholesterol 05/14/2024 125  mg/dL Final    Comment: Risk category and Non-HDL cholesterol goals:  Coronary heart disease (CHD)or equivalent (10-year risk of CHD >20%):  Non-HDL cholesterol goal     <130 mg/dL  Two or more CHD risk factors and 10-year risk of CHD <= 20%:  Non-HDL cholesterol goal     <160 mg/dL  0 to 1 CHD risk factor:  Non-HDL cholesterol goal     <190 mg/dL      Hemoglobin A1C 05/14/2024 5.5  4.0 - 5.6 % Final    Comment: ADA Screening Guidelines:  5.7-6.4%  Consistent with prediabetes  >or=6.5%  Consistent with diabetes    High levels of fetal hemoglobin interfere with the HbA1C  assay. Heterozygous hemoglobin variants (HbS, HgC, etc)do  not significantly interfere with this assay.   However, presence of multiple variants may affect accuracy.      Estimated Avg Glucose 05/14/2024 111  68 - 131 mg/dL Final    Sodium 05/14/2024 139  136 - 145 mmol/L Final    Potassium 05/14/2024 4.1  3.5 - 5.1 mmol/L Final    Chloride 05/14/2024 111 (H)  95 - 110 mmol/L Final    CO2 05/14/2024 19 (L)  23 - 29 mmol/L Final    Glucose 05/14/2024 102  70 - 110 mg/dL Final    BUN 05/14/2024 11  6 - 20 mg/dL Final    Creatinine 05/14/2024 0.7  0.5 - 1.4 mg/dL Final    Calcium 05/14/2024 9.2  8.7 - 10.5 mg/dL Final    Total Protein 05/14/2024 7.1  6.0 - 8.4 g/dL Final    Albumin 05/14/2024 3.9  3.5 - 5.2 g/dL Final    Total Bilirubin 05/14/2024 0.3   0.1 - 1.0 mg/dL Final    Comment: For infants and newborns, interpretation of results should be based  on gestational age, weight and in agreement with clinical  observations.    Premature Infant recommended reference ranges:  Up to 24 hours.............<8.0 mg/dL  Up to 48 hours............<12.0 mg/dL  3-5 days..................<15.0 mg/dL  6-29 days.................<15.0 mg/dL      Alkaline Phosphatase 05/14/2024 81  55 - 135 U/L Final    AST 05/14/2024 17  10 - 40 U/L Final    ALT 05/14/2024 29  10 - 44 U/L Final    eGFR 05/14/2024 >60  >60 mL/min/1.73 m^2 Final    Anion Gap 05/14/2024 9  8 - 16 mmol/L Final    WBC 05/14/2024 8.40  3.90 - 12.70 K/uL Final    RBC 05/14/2024 4.52  4.00 - 5.40 M/uL Final    Hemoglobin 05/14/2024 13.1  12.0 - 16.0 g/dL Final    Hematocrit 05/14/2024 40.0  37.0 - 48.5 % Final    MCV 05/14/2024 89  82 - 98 fL Final    MCH 05/14/2024 29.0  27.0 - 31.0 pg Final    MCHC 05/14/2024 32.8  32.0 - 36.0 g/dL Final    RDW 05/14/2024 13.0  11.5 - 14.5 % Final    Platelets 05/14/2024 246  150 - 450 K/uL Final    MPV 05/14/2024 9.2  9.2 - 12.9 fL Final    Immature Granulocytes 05/14/2024 0.2  0.0 - 0.5 % Final    Gran # (ANC) 05/14/2024 5.2  1.8 - 7.7 K/uL Final    Immature Grans (Abs) 05/14/2024 0.02  0.00 - 0.04 K/uL Final    Comment: Mild elevation in immature granulocytes is non specific and   can be seen in a variety of conditions including stress response,   acute inflammation, trauma and pregnancy. Correlation with other   laboratory and clinical findings is essential.      Lymph # 05/14/2024 2.3  1.0 - 4.8 K/uL Final    Mono # 05/14/2024 0.5  0.3 - 1.0 K/uL Final    Eos # 05/14/2024 0.4  0.0 - 0.5 K/uL Final    Baso # 05/14/2024 0.05  0.00 - 0.20 K/uL Final    nRBC 05/14/2024 0  0 /100 WBC Final    Gran % 05/14/2024 61.5  38.0 - 73.0 % Final    Lymph % 05/14/2024 26.8  18.0 - 48.0 % Final    Mono % 05/14/2024 6.1  4.0 - 15.0 % Final    Eosinophil % 05/14/2024 4.8  0.0 - 8.0 % Final     Basophil % 05/14/2024 0.6  0.0 - 1.9 % Final    Differential Method 05/14/2024 Automated   Final       ASSESSMENT/PLAN:        1. Routine medical exam  Overview:  - preventative health counseling  - counseling on current recommendations for breast cancer screening. Average risk and recommend at 40-44 yo  - counseling on current recommendations for cervical cancer screening. UTD   - counseling on current recommendations for colon cancer screening. Average risk  - Vaccines recommended at this visit: see below      Orders:  -     CBC Auto Differential; Future; Expected date: 07/14/2025  -     Comprehensive Metabolic Panel; Future; Expected date: 07/14/2025  -     Hemoglobin A1C; Future; Expected date: 07/14/2025  -     Lipid Panel; Future; Expected date: 07/14/2025  -     TSH; Future; Expected date: 07/14/2025  -     Thyroid Peroxidase Antibody; Future; Expected date: 07/14/2025  -     T4, Free; Future; Expected date: 07/14/2025  -     T3; Future; Expected date: 07/14/2025  -     Cortisol; Future; Expected date: 07/14/2025    2. Gastroesophageal reflux disease without esophagitis  Overview:  - counseling on management of GERD   - previously used pantoprazole 40 mg daily however has not needed since stopping coffee  - continue current management plan      3. Anxiety  Overview:  - counseling on management of anxiety   - previously saw a therapist and considering resuming  - reports increased anxiety occurring every other day with physical manifestations (tachycardia, diaphoresis)  - discussed options for treatment and she opts for PRN medication  - discussed SSRI may be helpful if PRN medications fail  - previous poor effect with buspirone  - recommend trial propranolol 10 mg TID PRN anxiety  - counseling regarding new medication including expected results, potential side effects, and appropriate use.  - follow-up one month to reassess    Orders:  -     Cortisol; Future; Expected date: 07/14/2025  -     propranoloL  (INDERAL) 10 MG tablet; Take 1 tablet (10 mg total) by mouth 3 (three) times daily as needed (anxiety).  Dispense: 90 tablet; Refill: 11    4. Abnormal thyroid blood test  Overview:  - 1/31/23 TPO 88 elevated  Lab Results   Component Value Date    TSH 0.924 01/31/2023    FREET4 0.94 01/31/2023     - the lab did not collect the labs previously ordered   - she is asymptomatic   - due for routine labs and will include thyroid      Orders:  -     TSH; Future; Expected date: 07/14/2025  -     Thyroid Peroxidase Antibody; Future; Expected date: 07/14/2025  -     T4, Free; Future; Expected date: 07/14/2025  -     T3; Future; Expected date: 07/14/2025    5. Class 1 obesity due to excess calories with serious comorbidity and body mass index (BMI) of 31.0 to 31.9 in adult  Assessment & Plan:  - comorbidity = PCOS  - discussed recommendation for diet and cardiovascular exercise  - counseling on lifestyle modifications for risk factor reduction  - counseling on management options and she opts for GLP-1 agonist pending labs  - Counseling on medication zepbound, mechanism of action, administration, disposal, expected results, side effects, and need for follow-up  - denies personal/family history of pancreatitis, MENS2, medullary thyroid cancer      6. PCOS (polycystic ovarian syndrome)  Overview:  - ovarian cyst status post unilateral oophorectomy , hirsutism, dysmenorrhea, central obesity   -I do suspect she has PCOS   -counseled on management of PCOS       7. Other insomnia  Overview:  - recently had appt with Sleep medicine  - does not appear consistent with ASHLEY; likely anxiety induced  - recommend optimize anxiety management      Vaccines recommended: Covid-19 and Tetanus and she defers    Follow up in about 4 weeks (around 8/11/2025) for anxiety and weight loss.     MORIAH Vidales   Internal Medicine

## 2025-07-15 ENCOUNTER — LAB VISIT (OUTPATIENT)
Dept: LAB | Facility: HOSPITAL | Age: 40
End: 2025-07-15
Attending: INTERNAL MEDICINE
Payer: COMMERCIAL

## 2025-07-15 DIAGNOSIS — R79.89 ABNORMAL THYROID BLOOD TEST: ICD-10-CM

## 2025-07-15 DIAGNOSIS — Z00.00 ROUTINE MEDICAL EXAM: ICD-10-CM

## 2025-07-15 DIAGNOSIS — F41.9 ANXIETY: ICD-10-CM

## 2025-07-15 LAB
ABSOLUTE EOSINOPHIL (OHS): 0.37 K/UL
ABSOLUTE MONOCYTE (OHS): 0.59 K/UL (ref 0.3–1)
ABSOLUTE NEUTROPHIL COUNT (OHS): 5.95 K/UL (ref 1.8–7.7)
ALBUMIN SERPL BCP-MCNC: 4.2 G/DL (ref 3.5–5.2)
ALP SERPL-CCNC: 91 UNIT/L (ref 40–150)
ALT SERPL W/O P-5'-P-CCNC: 39 UNIT/L (ref 10–44)
ANION GAP (OHS): 9 MMOL/L (ref 8–16)
AST SERPL-CCNC: 21 UNIT/L (ref 11–45)
BASOPHILS # BLD AUTO: 0.06 K/UL
BASOPHILS NFR BLD AUTO: 0.6 %
BILIRUB SERPL-MCNC: 0.2 MG/DL (ref 0.1–1)
BUN SERPL-MCNC: 11 MG/DL (ref 6–20)
CALCIUM SERPL-MCNC: 8.4 MG/DL (ref 8.7–10.5)
CHLORIDE SERPL-SCNC: 111 MMOL/L (ref 95–110)
CHOLEST SERPL-MCNC: 178 MG/DL (ref 120–199)
CHOLEST/HDLC SERPL: 4 {RATIO} (ref 2–5)
CO2 SERPL-SCNC: 19 MMOL/L (ref 23–29)
CORTIS SERPL-MCNC: 6 UG/DL
CREAT SERPL-MCNC: 0.6 MG/DL (ref 0.5–1.4)
EAG (OHS): 111 MG/DL (ref 68–131)
ERYTHROCYTE [DISTWIDTH] IN BLOOD BY AUTOMATED COUNT: 12.7 % (ref 11.5–14.5)
GFR SERPLBLD CREATININE-BSD FMLA CKD-EPI: >60 ML/MIN/1.73/M2
GLUCOSE SERPL-MCNC: 94 MG/DL (ref 70–110)
HBA1C MFR BLD: 5.5 % (ref 4–5.6)
HCT VFR BLD AUTO: 39.9 % (ref 37–48.5)
HDLC SERPL-MCNC: 44 MG/DL (ref 40–75)
HDLC SERPL: 24.7 % (ref 20–50)
HGB BLD-MCNC: 13.7 GM/DL (ref 12–16)
IMM GRANULOCYTES # BLD AUTO: 0.04 K/UL (ref 0–0.04)
IMM GRANULOCYTES NFR BLD AUTO: 0.4 % (ref 0–0.5)
LDLC SERPL CALC-MCNC: 113.4 MG/DL (ref 63–159)
LYMPHOCYTES # BLD AUTO: 2.46 K/UL (ref 1–4.8)
MCH RBC QN AUTO: 30.4 PG (ref 27–31)
MCHC RBC AUTO-ENTMCNC: 34.3 G/DL (ref 32–36)
MCV RBC AUTO: 89 FL (ref 82–98)
NONHDLC SERPL-MCNC: 134 MG/DL
NUCLEATED RBC (/100WBC) (OHS): 0 /100 WBC
PLATELET # BLD AUTO: 296 K/UL (ref 150–450)
PMV BLD AUTO: 9.8 FL (ref 9.2–12.9)
POTASSIUM SERPL-SCNC: 4.2 MMOL/L (ref 3.5–5.1)
PROT SERPL-MCNC: 7.1 GM/DL (ref 6–8.4)
RBC # BLD AUTO: 4.51 M/UL (ref 4–5.4)
RELATIVE EOSINOPHIL (OHS): 3.9 %
RELATIVE LYMPHOCYTE (OHS): 26 % (ref 18–48)
RELATIVE MONOCYTE (OHS): 6.2 % (ref 4–15)
RELATIVE NEUTROPHIL (OHS): 62.9 % (ref 38–73)
SODIUM SERPL-SCNC: 139 MMOL/L (ref 136–145)
T3FREE SERPL-MCNC: 107 NG/DL (ref 60–180)
T4 FREE SERPL-MCNC: 0.98 NG/DL (ref 0.71–1.51)
THYROPEROXIDASE IGG SERPL-ACNC: 234.9 IU/ML
TRIGL SERPL-MCNC: 103 MG/DL (ref 30–150)
TSH SERPL-ACNC: 0.98 UIU/ML (ref 0.4–4)
WBC # BLD AUTO: 9.47 K/UL (ref 3.9–12.7)

## 2025-07-15 PROCEDURE — 82533 TOTAL CORTISOL: CPT

## 2025-07-15 PROCEDURE — 80053 COMPREHEN METABOLIC PANEL: CPT

## 2025-07-15 PROCEDURE — 85025 COMPLETE CBC W/AUTO DIFF WBC: CPT

## 2025-07-15 PROCEDURE — 86376 MICROSOMAL ANTIBODY EACH: CPT

## 2025-07-15 PROCEDURE — 84443 ASSAY THYROID STIM HORMONE: CPT

## 2025-07-15 PROCEDURE — 83036 HEMOGLOBIN GLYCOSYLATED A1C: CPT

## 2025-07-15 PROCEDURE — 36415 COLL VENOUS BLD VENIPUNCTURE: CPT | Mod: PN

## 2025-07-15 PROCEDURE — 84439 ASSAY OF FREE THYROXINE: CPT

## 2025-07-15 PROCEDURE — 80061 LIPID PANEL: CPT

## 2025-07-15 PROCEDURE — 84480 ASSAY TRIIODOTHYRONINE (T3): CPT

## 2025-07-22 ENCOUNTER — TELEPHONE (OUTPATIENT)
Dept: SLEEP MEDICINE | Facility: OTHER | Age: 40
End: 2025-07-22
Payer: COMMERCIAL

## 2025-08-04 ENCOUNTER — TELEPHONE (OUTPATIENT)
Dept: SLEEP MEDICINE | Facility: OTHER | Age: 40
End: 2025-08-04
Payer: COMMERCIAL

## 2025-08-04 NOTE — TELEPHONE ENCOUNTER
Left message to confirm the home sleep study for August 5th,also gave number to my supervisor incgrayson I'm with patients.

## 2025-08-08 ENCOUNTER — TELEPHONE (OUTPATIENT)
Dept: SLEEP MEDICINE | Facility: OTHER | Age: 40
End: 2025-08-08
Payer: COMMERCIAL

## 2025-08-08 NOTE — TELEPHONE ENCOUNTER
Left message to confirm the home sleep study,also left my supervisors number incase I'm with patients.

## 2025-08-11 ENCOUNTER — HOSPITAL ENCOUNTER (OUTPATIENT)
Dept: SLEEP MEDICINE | Facility: OTHER | Age: 40
Discharge: HOME | End: 2025-08-11
Attending: PHYSICIAN ASSISTANT
Payer: COMMERCIAL

## 2025-08-11 DIAGNOSIS — R35.1 NOCTURIA: ICD-10-CM

## 2025-08-11 DIAGNOSIS — F51.09 OTHER INSOMNIA NOT DUE TO A SUBSTANCE OR KNOWN PHYSIOLOGICAL CONDITION: ICD-10-CM

## 2025-08-11 DIAGNOSIS — R06.83 SNORING: ICD-10-CM

## 2025-08-11 DIAGNOSIS — G47.19 EXCESSIVE DAYTIME SLEEPINESS: ICD-10-CM

## 2025-08-11 PROCEDURE — 95800 SLP STDY UNATTENDED: CPT | Mod: 52

## 2025-08-12 PROBLEM — R06.83 SNORING: Status: ACTIVE | Noted: 2025-08-12

## 2025-08-13 ENCOUNTER — PATIENT MESSAGE (OUTPATIENT)
Dept: SLEEP MEDICINE | Facility: CLINIC | Age: 40
End: 2025-08-13

## 2025-08-24 ENCOUNTER — PATIENT MESSAGE (OUTPATIENT)
Dept: PRIMARY CARE CLINIC | Facility: CLINIC | Age: 40
End: 2025-08-24
Payer: COMMERCIAL